# Patient Record
Sex: FEMALE | Race: WHITE | Employment: UNEMPLOYED | URBAN - METROPOLITAN AREA
[De-identification: names, ages, dates, MRNs, and addresses within clinical notes are randomized per-mention and may not be internally consistent; named-entity substitution may affect disease eponyms.]

---

## 2017-06-02 ENCOUNTER — HOSPITAL ENCOUNTER (OUTPATIENT)
Dept: MAMMOGRAPHY | Facility: HOSPITAL | Age: 52
Discharge: HOME | End: 2017-06-02
Attending: SURGERY
Payer: COMMERCIAL

## 2017-06-02 DIAGNOSIS — C50.919 BREAST CANCER (HCC): ICD-10-CM

## 2017-06-02 PROCEDURE — 77065 DX MAMMO INCL CAD UNI: CPT | Performed by: SURGERY

## 2017-06-02 PROCEDURE — 77063 BREAST TOMOSYNTHESIS BI: CPT | Performed by: SURGERY

## 2018-05-12 ENCOUNTER — HOSPITAL ENCOUNTER (OUTPATIENT)
Dept: MAMMOGRAPHY | Facility: HOSPITAL | Age: 53
Discharge: HOME | End: 2018-05-12
Attending: SURGERY
Payer: COMMERCIAL

## 2018-05-12 DIAGNOSIS — Z90.11 STATUS POST RIGHT MASTECTOMY: ICD-10-CM

## 2018-05-12 DIAGNOSIS — D05.10 DCIS (DUCTAL CARCINOMA IN SITU): ICD-10-CM

## 2018-05-12 PROCEDURE — 77067 SCR MAMMO BI INCL CAD: CPT | Performed by: SURGERY

## 2018-05-12 PROCEDURE — 77063 BREAST TOMOSYNTHESIS BI: CPT | Performed by: SURGERY

## 2018-08-07 ENCOUNTER — HOSPITAL ENCOUNTER (OUTPATIENT)
Dept: ULTRASOUND IMAGING | Facility: HOSPITAL | Age: 53
Discharge: HOME | End: 2018-08-07
Attending: SURGERY
Payer: COMMERCIAL

## 2018-08-07 ENCOUNTER — HOSPITAL ENCOUNTER (OUTPATIENT)
Dept: GENERAL RADIOLOGY | Facility: HOSPITAL | Age: 53
Discharge: HOME | End: 2018-08-07
Attending: SURGERY
Payer: COMMERCIAL

## 2018-08-07 DIAGNOSIS — M79.603 UPPER EXTREMITY PAIN: ICD-10-CM

## 2018-08-07 DIAGNOSIS — M79.603: ICD-10-CM

## 2018-08-07 PROCEDURE — 72070 X-RAY EXAM THORAC SPINE 2VWS: CPT | Performed by: SURGERY

## 2018-08-07 PROCEDURE — 93971 EXTREMITY STUDY: CPT | Performed by: SURGERY

## 2018-08-07 PROCEDURE — 72040 X-RAY EXAM NECK SPINE 2-3 VW: CPT | Performed by: SURGERY

## 2018-08-09 ENCOUNTER — HOSPITAL ENCOUNTER (OUTPATIENT)
Dept: ULTRASOUND IMAGING | Facility: HOSPITAL | Age: 53
Discharge: HOME | End: 2018-08-09
Attending: SURGERY
Payer: COMMERCIAL

## 2018-08-09 DIAGNOSIS — M79.603 UPPER EXTREMITY PAIN: ICD-10-CM

## 2018-08-09 PROCEDURE — 93931 UPPER EXTREMITY STUDY: CPT | Performed by: SURGERY

## 2020-04-27 ENCOUNTER — TELEPHONE (OUTPATIENT)
Dept: FAMILY MEDICINE CLINIC | Facility: CLINIC | Age: 55
End: 2020-04-27

## 2020-04-27 NOTE — TELEPHONE ENCOUNTER
Please schedule pt for appointment in office tomorrow morning. Pt will be given mask at front entrance.

## 2020-04-27 NOTE — TELEPHONE ENCOUNTER
Patient is calling in because she is a diabetic and her levels were supposed to be checked when she established care with Dr. Pierre Lund in March. Due to covid patient's appointment was canceled.  She has been unable to either be checked or go to the pharmacy to

## 2020-04-30 ENCOUNTER — OFFICE VISIT (OUTPATIENT)
Dept: FAMILY MEDICINE CLINIC | Facility: CLINIC | Age: 55
End: 2020-04-30
Payer: COMMERCIAL

## 2020-04-30 ENCOUNTER — LAB ENCOUNTER (OUTPATIENT)
Dept: LAB | Age: 55
End: 2020-04-30
Attending: FAMILY MEDICINE
Payer: COMMERCIAL

## 2020-04-30 VITALS
DIASTOLIC BLOOD PRESSURE: 75 MMHG | BODY MASS INDEX: 26.82 KG/M2 | HEIGHT: 65 IN | HEART RATE: 74 BPM | WEIGHT: 161 LBS | SYSTOLIC BLOOD PRESSURE: 125 MMHG

## 2020-04-30 DIAGNOSIS — E07.9 THYROID DISEASE: Primary | ICD-10-CM

## 2020-04-30 DIAGNOSIS — E11.9 TYPE 2 DIABETES MELLITUS WITHOUT COMPLICATION, WITHOUT LONG-TERM CURRENT USE OF INSULIN (HCC): ICD-10-CM

## 2020-04-30 DIAGNOSIS — I10 ESSENTIAL HYPERTENSION: ICD-10-CM

## 2020-04-30 DIAGNOSIS — Z85.3 HISTORY OF BREAST CANCER: ICD-10-CM

## 2020-04-30 DIAGNOSIS — E78.5 HYPERLIPIDEMIA, UNSPECIFIED HYPERLIPIDEMIA TYPE: ICD-10-CM

## 2020-04-30 PROCEDURE — 36415 COLL VENOUS BLD VENIPUNCTURE: CPT

## 2020-04-30 PROCEDURE — 82043 UR ALBUMIN QUANTITATIVE: CPT

## 2020-04-30 PROCEDURE — 85025 COMPLETE CBC W/AUTO DIFF WBC: CPT

## 2020-04-30 PROCEDURE — 80053 COMPREHEN METABOLIC PANEL: CPT

## 2020-04-30 PROCEDURE — 80061 LIPID PANEL: CPT

## 2020-04-30 PROCEDURE — 84443 ASSAY THYROID STIM HORMONE: CPT

## 2020-04-30 PROCEDURE — 83036 HEMOGLOBIN GLYCOSYLATED A1C: CPT

## 2020-04-30 PROCEDURE — 99203 OFFICE O/P NEW LOW 30 MIN: CPT | Performed by: FAMILY MEDICINE

## 2020-04-30 PROCEDURE — 82570 ASSAY OF URINE CREATININE: CPT

## 2020-04-30 RX ORDER — LISINOPRIL 40 MG/1
1 TABLET ORAL DAILY
COMMUNITY
Start: 2020-04-25 | End: 2020-05-02

## 2020-04-30 RX ORDER — ATORVASTATIN CALCIUM 20 MG/1
20 TABLET, FILM COATED ORAL DAILY
COMMUNITY
Start: 2019-11-05 | End: 2020-05-02

## 2020-04-30 RX ORDER — LEVOTHYROXINE SODIUM 0.05 MG/1
50 TABLET ORAL DAILY
COMMUNITY
Start: 2020-03-29 | End: 2020-05-02

## 2020-04-30 RX ORDER — GLIMEPIRIDE 2 MG/1
1 TABLET ORAL DAILY
COMMUNITY
Start: 2020-03-29 | End: 2020-05-02

## 2020-04-30 NOTE — PROGRESS NOTES
Marcia Guevara is a 49year old female. Patient presents with:  Hyperlipidemia  Hypertension  Diabetes    HPI:   New patient to me. Had breast Ca in 2002. Reports last mammogram last year at end of year.  Reports has been normal.   Reports diabetes is cont pedal pulses. Normal sensation. No ulcerations. ASSESSMENT AND PLAN:   1. Thyroid disease      2. Hyperlipidemia, unspecified hyperlipidemia type      3.  Type 2 diabetes mellitus without complication, without long-term current use of insulin ([facility] [address]  Isac

## 2020-05-02 NOTE — PROGRESS NOTES
Glucose is a bit elevated - I am increasing her glimepiride to twice a day. The other medications will stay the same. Continue to work on [employer] and exercise. Plan to recheck diabetes in 3 months.

## 2020-05-04 ENCOUNTER — TELEPHONE (OUTPATIENT)
Dept: FAMILY MEDICINE CLINIC | Facility: CLINIC | Age: 55
End: 2020-05-04

## 2020-05-04 NOTE — TELEPHONE ENCOUNTER
Pt call transferred to RN with [employer] , Angelina Marcelino agent# [id number] in place. Verified pt name and . Reviewed test results and recommendations as noted below. Pt agreed with plan of care and had no further questions at this time.       Notes re

## 2020-07-07 ENCOUNTER — TELEPHONE (OUTPATIENT)
Dept: FAMILY MEDICINE CLINIC | Facility: CLINIC | Age: 55
End: 2020-07-07

## 2020-07-07 DIAGNOSIS — E11.9 DIABETES MELLITUS WITH NO COMPLICATION (HCC): Primary | ICD-10-CM

## 2020-07-07 NOTE — TELEPHONE ENCOUNTER
Patient has a virtual diabetic follow up appointment scheduled 7/22 and is requesting orders be placed for any needed blood work. Patient is requesting a call once order are placed.

## 2020-07-07 NOTE — TELEPHONE ENCOUNTER
Dr. Mares General pt's last blood work was on 04/30/2020. Pt has appt on 7/22/2020. Per last notes to repeat Diabetes in 3 months. I ordered CMP did you want to add anything else. Please advise.

## 2020-07-15 ENCOUNTER — LAB ENCOUNTER (OUTPATIENT)
Dept: LAB | Age: 55
End: 2020-07-15
Attending: FAMILY MEDICINE
Payer: COMMERCIAL

## 2020-07-15 DIAGNOSIS — E11.9 TYPE 2 DIABETES MELLITUS WITHOUT COMPLICATION, WITHOUT LONG-TERM CURRENT USE OF INSULIN (HCC): ICD-10-CM

## 2020-07-15 DIAGNOSIS — E11.9 DIABETES MELLITUS WITH NO COMPLICATION (HCC): ICD-10-CM

## 2020-07-15 LAB
ALBUMIN SERPL-MCNC: 4.2 G/DL (ref 3.4–5)
ALBUMIN/GLOB SERPL: 1.1 {RATIO} (ref 1–2)
ALP LIVER SERPL-CCNC: 114 U/L (ref 41–108)
ALT SERPL-CCNC: 29 U/L (ref 13–56)
ANION GAP SERPL CALC-SCNC: 6 MMOL/L (ref 0–18)
AST SERPL-CCNC: 15 U/L (ref 15–37)
BILIRUB SERPL-MCNC: 0.6 MG/DL (ref 0.1–2)
BUN BLD-MCNC: 15 MG/DL (ref 7–18)
BUN/CREAT SERPL: 21.1 (ref 10–20)
CALCIUM BLD-MCNC: 9.6 MG/DL (ref 8.5–10.1)
CHLORIDE SERPL-SCNC: 103 MMOL/L (ref 98–112)
CO2 SERPL-SCNC: 28 MMOL/L (ref 21–32)
CREAT BLD-MCNC: 0.71 MG/DL (ref 0.55–1.02)
EST. AVERAGE GLUCOSE BLD GHB EST-MCNC: 148 MG/DL (ref 68–126)
GLOBULIN PLAS-MCNC: 3.8 G/DL (ref 2.8–4.4)
GLUCOSE BLD-MCNC: 103 MG/DL (ref 70–99)
HBA1C MFR BLD HPLC: 6.8 % (ref ?–5.7)
M PROTEIN MFR SERPL ELPH: 8 G/DL (ref 6.4–8.2)
OSMOLALITY SERPL CALC.SUM OF ELEC: 285 MOSM/KG (ref 275–295)
PATIENT FASTING Y/N/NP: YES
POTASSIUM SERPL-SCNC: 4.1 MMOL/L (ref 3.5–5.1)
SODIUM SERPL-SCNC: 137 MMOL/L (ref 136–145)

## 2020-07-15 PROCEDURE — 80053 COMPREHEN METABOLIC PANEL: CPT

## 2020-07-15 PROCEDURE — 36415 COLL VENOUS BLD VENIPUNCTURE: CPT

## 2020-07-15 PROCEDURE — 83036 HEMOGLOBIN GLYCOSYLATED A1C: CPT

## 2020-07-21 ENCOUNTER — VIRTUAL PHONE E/M (OUTPATIENT)
Dept: FAMILY MEDICINE CLINIC | Facility: CLINIC | Age: 55
End: 2020-07-21
Payer: COMMERCIAL

## 2020-07-21 DIAGNOSIS — E11.9 DIABETES MELLITUS WITH NO COMPLICATION (HCC): Primary | ICD-10-CM

## 2020-07-21 DIAGNOSIS — E07.9 THYROID DISEASE: ICD-10-CM

## 2020-07-21 DIAGNOSIS — Z12.31 VISIT FOR SCREENING MAMMOGRAM: ICD-10-CM

## 2020-07-21 PROCEDURE — 99441 PHONE E/M BY PHYS 5-10 MIN: CPT | Performed by: FAMILY MEDICINE

## 2020-07-21 RX ORDER — GLIMEPIRIDE 1 MG/1
1 TABLET ORAL 2 TIMES DAILY
Qty: 180 TABLET | Refills: 2 | Status: SHIPPED | OUTPATIENT
Start: 2020-07-21 | End: 2021-02-04

## 2020-07-21 NOTE — PROGRESS NOTES
Virtual Telephone Check-In    Mendez Sheppard verbally consents to a Virtual/Telephone Check-In visit on 07/21/20. Patient has been referred to the [facility] website at [email or web address] to review the yearly Consent to Treat document.     Patient underst denies cough  CARDIOVASCULAR: denies chest pain  GI: denies abdominal pain and denies heartburn  NEURO: denies headaches  Musculoskeletal: no joint pain, back pain    EXAM:   There were no vitals taken for this visit.   GENERAL: speaking in complete sentenc

## 2020-07-22 ENCOUNTER — TELEPHONE (OUTPATIENT)
Dept: FAMILY MEDICINE CLINIC | Facility: CLINIC | Age: 55
End: 2020-07-22

## 2020-07-22 DIAGNOSIS — Z12.31 BREAST CANCER SCREENING BY MAMMOGRAM: Primary | ICD-10-CM

## 2020-07-22 NOTE — TELEPHONE ENCOUNTER
Patient states she has an order for mammogram, she forgot to tell Dr that she only has one breast. Requesting for order to be changed to only left breast. She has appointment on 7/24 and does not want to run the risk of checking both breast. Requesting vivian

## 2020-07-24 ENCOUNTER — HOSPITAL ENCOUNTER (OUTPATIENT)
Dept: MAMMOGRAPHY | Age: 55
Discharge: HOME | End: 2020-07-24
Attending: FAMILY MEDICINE
Payer: COMMERCIAL

## 2020-07-24 DIAGNOSIS — Z12.31 BREAST CANCER SCREENING BY MAMMOGRAM: ICD-10-CM

## 2020-07-24 DIAGNOSIS — Z12.31 VISIT FOR SCREENING MAMMOGRAM: ICD-10-CM

## 2020-07-24 PROCEDURE — 77067 SCR MAMMO BI INCL CAD: CPT | Performed by: FAMILY MEDICINE

## 2020-07-24 PROCEDURE — 77063 BREAST TOMOSYNTHESIS BI: CPT | Performed by: FAMILY MEDICINE

## 2020-07-30 ENCOUNTER — TELEPHONE (OUTPATIENT)
Dept: FAMILY MEDICINE CLINIC | Facility: CLINIC | Age: 55
End: 2020-07-30

## 2020-07-30 NOTE — TELEPHONE ENCOUNTER
Pt called for mammogram results and informed of results in Stat[employer] language along with Dr Huey Richard recommendations. Pt verbalized understanding and agreed with MD plan.       Marisa Hernandez MD  7/29/2020  3:08 PM      Normal mammogram. Plan for repeat in 1 y

## 2020-10-20 ENCOUNTER — OFFICE VISIT (OUTPATIENT)
Dept: FAMILY MEDICINE CLINIC | Facility: CLINIC | Age: 55
End: 2020-10-20
Payer: COMMERCIAL

## 2020-10-20 ENCOUNTER — LAB ENCOUNTER (OUTPATIENT)
Dept: LAB | Age: 55
End: 2020-10-20
Attending: FAMILY MEDICINE
Payer: COMMERCIAL

## 2020-10-20 VITALS
WEIGHT: 164 LBS | TEMPERATURE: 98 F | BODY MASS INDEX: 27 KG/M2 | SYSTOLIC BLOOD PRESSURE: 111 MMHG | DIASTOLIC BLOOD PRESSURE: 64 MMHG | HEART RATE: 68 BPM

## 2020-10-20 DIAGNOSIS — E78.5 HYPERLIPIDEMIA, UNSPECIFIED HYPERLIPIDEMIA TYPE: ICD-10-CM

## 2020-10-20 DIAGNOSIS — G89.29 CHRONIC HEEL PAIN, RIGHT: ICD-10-CM

## 2020-10-20 DIAGNOSIS — E11.9 DIABETES MELLITUS WITH NO COMPLICATION (HCC): ICD-10-CM

## 2020-10-20 DIAGNOSIS — I10 ESSENTIAL HYPERTENSION: Primary | ICD-10-CM

## 2020-10-20 DIAGNOSIS — E07.9 THYROID DISEASE: ICD-10-CM

## 2020-10-20 DIAGNOSIS — M79.671 CHRONIC HEEL PAIN, RIGHT: ICD-10-CM

## 2020-10-20 DIAGNOSIS — E11.9 TYPE 2 DIABETES MELLITUS WITHOUT COMPLICATION, WITHOUT LONG-TERM CURRENT USE OF INSULIN (HCC): ICD-10-CM

## 2020-10-20 PROCEDURE — 84439 ASSAY OF FREE THYROXINE: CPT

## 2020-10-20 PROCEDURE — 3078F DIAST BP <80 MM HG: CPT | Performed by: FAMILY MEDICINE

## 2020-10-20 PROCEDURE — 83036 HEMOGLOBIN GLYCOSYLATED A1C: CPT

## 2020-10-20 PROCEDURE — 84443 ASSAY THYROID STIM HORMONE: CPT

## 2020-10-20 PROCEDURE — 99214 OFFICE O/P EST MOD 30 MIN: CPT | Performed by: FAMILY MEDICINE

## 2020-10-20 PROCEDURE — 36415 COLL VENOUS BLD VENIPUNCTURE: CPT

## 2020-10-20 PROCEDURE — 3074F SYST BP LT 130 MM HG: CPT | Performed by: FAMILY MEDICINE

## 2020-10-20 NOTE — PROGRESS NOTES
Merlin Pilar is a 48year old female. Patient presents with:  Diabetes: medication follow up    HPI:   Reports glucose 110-130 range - very rarely down to 60s. - only when very active. Has been walking. Trying to eat healthy.    Reports 1 year with pain i to auscultation  CARDIO: RRR without murmur  EXTREMITIES: no cyanosis, clubbing or edema  FEET: normal pedal pulses. Normal sensation. No ulceration. Right posterior heel tenderness. ASSESSMENT AND PLAN:   1. Essential hypertension  BP stable    2.  Hyp

## 2020-10-23 ENCOUNTER — TELEPHONE (OUTPATIENT)
Dept: FAMILY MEDICINE CLINIC | Facility: CLINIC | Age: 55
End: 2020-10-23

## 2020-10-24 NOTE — PROGRESS NOTES
Thyroid levels are stable - continue current dose. Diabetes is a bit worse - be better with the diet. Will not change medications for now.

## 2021-01-05 ENCOUNTER — TELEPHONE (OUTPATIENT)
Dept: FAMILY MEDICINE CLINIC | Facility: CLINIC | Age: 56
End: 2021-01-05

## 2021-01-05 DIAGNOSIS — E11.9 TYPE 2 DIABETES MELLITUS WITHOUT COMPLICATION, WITHOUT LONG-TERM CURRENT USE OF INSULIN (HCC): Primary | ICD-10-CM

## 2021-01-05 NOTE — TELEPHONE ENCOUNTER
Left voicemail for patient informing her that order was placed and tests should be done fasting. If she had any questions or concerns to call back.

## 2021-01-14 ENCOUNTER — LAB ENCOUNTER (OUTPATIENT)
Dept: LAB | Age: 56
End: 2021-01-14
Attending: FAMILY MEDICINE
Payer: COMMERCIAL

## 2021-01-14 DIAGNOSIS — E11.9 TYPE 2 DIABETES MELLITUS WITHOUT COMPLICATION, WITHOUT LONG-TERM CURRENT USE OF INSULIN (HCC): ICD-10-CM

## 2021-01-14 LAB
ALBUMIN SERPL-MCNC: 4 G/DL (ref 3.4–5)
ALBUMIN/GLOB SERPL: 1 {RATIO} (ref 1–2)
ALP LIVER SERPL-CCNC: 116 U/L
ALT SERPL-CCNC: 28 U/L
ANION GAP SERPL CALC-SCNC: 5 MMOL/L (ref 0–18)
AST SERPL-CCNC: 16 U/L (ref 15–37)
BASOPHILS # BLD AUTO: 0.02 X10(3) UL (ref 0–0.2)
BASOPHILS NFR BLD AUTO: 0.3 %
BILIRUB SERPL-MCNC: 0.5 MG/DL (ref 0.1–2)
BUN BLD-MCNC: 12 MG/DL (ref 7–18)
BUN/CREAT SERPL: 17.1 (ref 10–20)
CALCIUM BLD-MCNC: 9.6 MG/DL (ref 8.5–10.1)
CHLORIDE SERPL-SCNC: 108 MMOL/L (ref 98–112)
CHOLEST SMN-MCNC: 126 MG/DL (ref ?–200)
CO2 SERPL-SCNC: 28 MMOL/L (ref 21–32)
CREAT BLD-MCNC: 0.7 MG/DL
DEPRECATED RDW RBC AUTO: 40.1 FL (ref 35.1–46.3)
EOSINOPHIL # BLD AUTO: 0.11 X10(3) UL (ref 0–0.7)
EOSINOPHIL NFR BLD AUTO: 1.5 %
ERYTHROCYTE [DISTWIDTH] IN BLOOD BY AUTOMATED COUNT: 12.2 % (ref 11–15)
EST. AVERAGE GLUCOSE BLD GHB EST-MCNC: 163 MG/DL (ref 68–126)
GLOBULIN PLAS-MCNC: 4 G/DL (ref 2.8–4.4)
GLUCOSE BLD-MCNC: 123 MG/DL (ref 70–99)
HBA1C MFR BLD HPLC: 7.3 % (ref ?–5.7)
HCT VFR BLD AUTO: 41.4 %
HDLC SERPL-MCNC: 42 MG/DL (ref 40–59)
HGB BLD-MCNC: 13.5 G/DL
IMM GRANULOCYTES # BLD AUTO: 0.01 X10(3) UL (ref 0–1)
IMM GRANULOCYTES NFR BLD: 0.1 %
LDLC SERPL CALC-MCNC: 61 MG/DL (ref ?–100)
LYMPHOCYTES # BLD AUTO: 3.16 X10(3) UL (ref 1–4)
LYMPHOCYTES NFR BLD AUTO: 43.3 %
M PROTEIN MFR SERPL ELPH: 8 G/DL (ref 6.4–8.2)
MCH RBC QN AUTO: 29.5 PG (ref 26–34)
MCHC RBC AUTO-ENTMCNC: 32.6 G/DL (ref 31–37)
MCV RBC AUTO: 90.6 FL
MONOCYTES # BLD AUTO: 0.29 X10(3) UL (ref 0.1–1)
MONOCYTES NFR BLD AUTO: 4 %
NEUTROPHILS # BLD AUTO: 3.71 X10 (3) UL (ref 1.5–7.7)
NEUTROPHILS # BLD AUTO: 3.71 X10(3) UL (ref 1.5–7.7)
NEUTROPHILS NFR BLD AUTO: 50.8 %
NONHDLC SERPL-MCNC: 84 MG/DL (ref ?–130)
OSMOLALITY SERPL CALC.SUM OF ELEC: 293 MOSM/KG (ref 275–295)
PATIENT FASTING Y/N/NP: YES
PATIENT FASTING Y/N/NP: YES
PLATELET # BLD AUTO: 222 10(3)UL (ref 150–450)
POTASSIUM SERPL-SCNC: 4.2 MMOL/L (ref 3.5–5.1)
RBC # BLD AUTO: 4.57 X10(6)UL
SODIUM SERPL-SCNC: 141 MMOL/L (ref 136–145)
TRIGL SERPL-MCNC: 116 MG/DL (ref 30–149)
TSI SER-ACNC: 1.24 MIU/ML (ref 0.36–3.74)
VLDLC SERPL CALC-MCNC: 23 MG/DL (ref 0–30)
WBC # BLD AUTO: 7.3 X10(3) UL (ref 4–11)

## 2021-01-14 PROCEDURE — 80053 COMPREHEN METABOLIC PANEL: CPT

## 2021-01-14 PROCEDURE — 80061 LIPID PANEL: CPT

## 2021-01-14 PROCEDURE — 83036 HEMOGLOBIN GLYCOSYLATED A1C: CPT

## 2021-01-14 PROCEDURE — 36415 COLL VENOUS BLD VENIPUNCTURE: CPT

## 2021-01-14 PROCEDURE — 84443 ASSAY THYROID STIM HORMONE: CPT

## 2021-01-14 PROCEDURE — 85025 COMPLETE CBC W/AUTO DIFF WBC: CPT

## 2021-01-15 ENCOUNTER — TELEPHONE (OUTPATIENT)
Dept: FAMILY MEDICINE CLINIC | Facility: CLINIC | Age: 56
End: 2021-01-15

## 2021-01-15 RX ORDER — EMPAGLIFLOZIN 10 MG/1
10 TABLET, FILM COATED ORAL DAILY
Qty: 90 TABLET | Refills: 1 | Status: SHIPPED | OUTPATIENT
Start: 2021-01-15 | End: 2021-02-14

## 2021-01-15 RX ORDER — CANAGLIFLOZIN 100 MG/1
100 TABLET, FILM COATED ORAL
Qty: 90 TABLET | Refills: 0 | Status: SHIPPED | OUTPATIENT
Start: 2021-01-15 | End: 2021-01-15

## 2021-01-15 NOTE — TELEPHONE ENCOUNTER
Per pharmacy, PA needed for the following medication:    •  Canagliflozin (INVOKANA) 100 MG Oral Tab, Take 100 mg by mouth every morning before breakfast., Disp: 90 tablet, Rfl: 0    Pharmacy comments:  Plan does not cover medication.  Please call plan at 8

## 2021-01-15 NOTE — PROGRESS NOTES
Cholesterol is stable. Diabetes has not improved. I want her better controlled. I will add another medication called invokana 100 mg in the mornings. It can help with weight loss also. See me in March in the office. Will check the a1c in the office.

## 2021-02-04 ENCOUNTER — TELEPHONE (OUTPATIENT)
Dept: FAMILY MEDICINE CLINIC | Facility: CLINIC | Age: 56
End: 2021-02-04

## 2021-02-04 NOTE — TELEPHONE ENCOUNTER
Pt asking if she should continue taking Jardiance. States her blood sugars have been low since starting the Jardiance about two weeks ago. Also medication makes her feel \"bad\", she gets dizzy.  Yesterday, 2/3/21 blood sugar was 56 at 1 PM. Pt takes [place]

## 2021-02-04 NOTE — TELEPHONE ENCOUNTER
I want her to try holding the glimepiride and only take jardiance and metformin. The reason is Tressia Colder has been shown to decrease heart disease also so it is a good medication for diabetes.  If she is still dizzy, then will go back to original medications

## 2021-02-04 NOTE — TELEPHONE ENCOUNTER
Advised patient on Dr. Javier Peer information and recommendations. Reviewed with patient. Advised to call back as needed, but to call back if worse. Patient verbalized understanding. Medication list updated.      Vandana Whitehead [id number]

## 2021-02-04 NOTE — TELEPHONE ENCOUNTER
Empagliflozin (JARDIANCE) 10 MG Oral Tab      Patient states with  Henri Prado [id number] that the above medication is making her sugar low and feeling unwell should she continue take it or take half of pill

## 2021-03-12 DIAGNOSIS — Z23 NEED FOR VACCINATION: ICD-10-CM

## 2021-03-15 ENCOUNTER — IMMUNIZATION (OUTPATIENT)
Dept: LAB | Facility: HOSPITAL | Age: 56
End: 2021-03-15
Attending: HOSPITALIST
Payer: COMMERCIAL

## 2021-03-15 DIAGNOSIS — Z23 NEED FOR VACCINATION: Primary | ICD-10-CM

## 2021-03-15 PROCEDURE — 0011A SARSCOV2 VAC 100MCG/0.5ML IM: CPT

## 2021-03-23 ENCOUNTER — OFFICE VISIT (OUTPATIENT)
Dept: FAMILY MEDICINE CLINIC | Facility: CLINIC | Age: 56
End: 2021-03-23
Payer: COMMERCIAL

## 2021-03-23 VITALS
DIASTOLIC BLOOD PRESSURE: 74 MMHG | TEMPERATURE: 97 F | SYSTOLIC BLOOD PRESSURE: 122 MMHG | BODY MASS INDEX: 26.29 KG/M2 | HEIGHT: 65 IN | WEIGHT: 157.81 LBS | HEART RATE: 64 BPM

## 2021-03-23 DIAGNOSIS — E11.9 TYPE 2 DIABETES MELLITUS WITHOUT COMPLICATION, WITHOUT LONG-TERM CURRENT USE OF INSULIN (HCC): Primary | ICD-10-CM

## 2021-03-23 DIAGNOSIS — I10 ESSENTIAL HYPERTENSION: ICD-10-CM

## 2021-03-23 DIAGNOSIS — E07.9 THYROID DISEASE: ICD-10-CM

## 2021-03-23 DIAGNOSIS — E78.5 HYPERLIPIDEMIA, UNSPECIFIED HYPERLIPIDEMIA TYPE: ICD-10-CM

## 2021-03-23 LAB
CARTRIDGE LOT#: ABNORMAL NUMERIC
HEMOGLOBIN A1C: 6.5 % (ref 4.3–5.6)

## 2021-03-23 PROCEDURE — 3078F DIAST BP <80 MM HG: CPT | Performed by: FAMILY MEDICINE

## 2021-03-23 PROCEDURE — 3044F HG A1C LEVEL LT 7.0%: CPT | Performed by: FAMILY MEDICINE

## 2021-03-23 PROCEDURE — 99214 OFFICE O/P EST MOD 30 MIN: CPT | Performed by: FAMILY MEDICINE

## 2021-03-23 PROCEDURE — 3074F SYST BP LT 130 MM HG: CPT | Performed by: FAMILY MEDICINE

## 2021-03-23 PROCEDURE — 36416 COLLJ CAPILLARY BLOOD SPEC: CPT | Performed by: FAMILY MEDICINE

## 2021-03-23 PROCEDURE — 83036 HEMOGLOBIN GLYCOSYLATED A1C: CPT | Performed by: FAMILY MEDICINE

## 2021-03-23 PROCEDURE — 3008F BODY MASS INDEX DOCD: CPT | Performed by: FAMILY MEDICINE

## 2021-03-23 RX ORDER — GLIMEPIRIDE 1 MG/1
TABLET ORAL
COMMUNITY
Start: 2021-02-23 | End: 2021-03-23

## 2021-03-23 RX ORDER — EMPAGLIFLOZIN 10 MG/1
1 TABLET, FILM COATED ORAL DAILY
COMMUNITY
Start: 2021-03-11 | End: 2021-03-23

## 2021-03-23 RX ORDER — GLIMEPIRIDE 1 MG/1
1 TABLET ORAL
Qty: 90 TABLET | Refills: 1 | Status: SHIPPED | OUTPATIENT
Start: 2021-03-23 | End: 2021-06-26

## 2021-03-23 RX ORDER — EMPAGLIFLOZIN 10 MG/1
1 TABLET, FILM COATED ORAL DAILY
Qty: 90 TABLET | Refills: 1 | Status: SHIPPED | OUTPATIENT
Start: 2021-03-23 | End: 2021-12-24

## 2021-03-23 NOTE — PROGRESS NOTES
Kaylyn King is a 48year old female. Patient presents with:  Medication Follow-Up: Jardiance    HPI:   Pt reports improving glucose and feeling better overall. Reports improved tingling in her feet.     Received 1st covid vaccine Moderna - felt fine oth 157 lb 12.8 oz (71.6 kg)   BMI 26.26 kg/m²   GENERAL: well developed, well nourished,in no apparent distress  SKIN: no rashes,no suspicious lesions  HEENT: atraumatic, normocephalic,ears and throat are clear  NECK: supple,no adenopathy,no bruits  LUNGS: cl

## 2021-04-12 ENCOUNTER — IMMUNIZATION (OUTPATIENT)
Dept: LAB | Facility: HOSPITAL | Age: 56
End: 2021-04-12
Attending: EMERGENCY MEDICINE
Payer: COMMERCIAL

## 2021-04-12 DIAGNOSIS — Z23 NEED FOR VACCINATION: Primary | ICD-10-CM

## 2021-04-12 PROCEDURE — 0012A SARSCOV2 VAC 100MCG/0.5ML IM: CPT

## 2021-05-05 ENCOUNTER — TELEPHONE (OUTPATIENT)
Dept: FAMILY MEDICINE CLINIC | Facility: CLINIC | Age: 56
End: 2021-05-05

## 2021-05-05 NOTE — TELEPHONE ENCOUNTER
Left message for patient, please inform of lab orders already in the system. Generated during office visit 03/23/2021.

## 2021-06-18 ENCOUNTER — LAB ENCOUNTER (OUTPATIENT)
Dept: LAB | Age: 56
End: 2021-06-18
Attending: FAMILY MEDICINE
Payer: COMMERCIAL

## 2021-06-18 DIAGNOSIS — E11.9 TYPE 2 DIABETES MELLITUS WITHOUT COMPLICATION, WITHOUT LONG-TERM CURRENT USE OF INSULIN (HCC): ICD-10-CM

## 2021-06-18 PROCEDURE — 84443 ASSAY THYROID STIM HORMONE: CPT

## 2021-06-18 PROCEDURE — 82570 ASSAY OF URINE CREATININE: CPT

## 2021-06-18 PROCEDURE — 80053 COMPREHEN METABOLIC PANEL: CPT

## 2021-06-18 PROCEDURE — 80061 LIPID PANEL: CPT

## 2021-06-18 PROCEDURE — 3061F NEG MICROALBUMINURIA REV: CPT | Performed by: FAMILY MEDICINE

## 2021-06-18 PROCEDURE — 83036 HEMOGLOBIN GLYCOSYLATED A1C: CPT

## 2021-06-18 PROCEDURE — 82043 UR ALBUMIN QUANTITATIVE: CPT

## 2021-06-18 PROCEDURE — 85025 COMPLETE CBC W/AUTO DIFF WBC: CPT

## 2021-06-18 PROCEDURE — 36415 COLL VENOUS BLD VENIPUNCTURE: CPT

## 2021-06-18 PROCEDURE — 3051F HG A1C>EQUAL 7.0%<8.0%: CPT | Performed by: FAMILY MEDICINE

## 2021-06-22 NOTE — PROGRESS NOTES
Brandon Carvajal - There is slight worsening of your diabetes. Please be better with your diet and increase exercise. Will discuss at your upcoming appointment.  Rest of the labs were stable. - Dr. Mcgee Erm

## 2021-06-26 RX ORDER — GLIMEPIRIDE 1 MG/1
1 TABLET ORAL 2 TIMES DAILY
Qty: 180 TABLET | Refills: 1 | Status: SHIPPED | OUTPATIENT
Start: 2021-06-26 | End: 2021-12-16

## 2021-06-28 ENCOUNTER — OFFICE VISIT (OUTPATIENT)
Dept: FAMILY MEDICINE CLINIC | Facility: CLINIC | Age: 56
End: 2021-06-28
Payer: COMMERCIAL

## 2021-06-28 VITALS
SYSTOLIC BLOOD PRESSURE: 100 MMHG | DIASTOLIC BLOOD PRESSURE: 57 MMHG | HEART RATE: 62 BPM | TEMPERATURE: 97 F | WEIGHT: 159 LBS | BODY MASS INDEX: 26.49 KG/M2 | HEIGHT: 65 IN

## 2021-06-28 DIAGNOSIS — I10 ESSENTIAL HYPERTENSION: ICD-10-CM

## 2021-06-28 DIAGNOSIS — E78.5 HYPERLIPIDEMIA, UNSPECIFIED HYPERLIPIDEMIA TYPE: ICD-10-CM

## 2021-06-28 DIAGNOSIS — E07.9 THYROID DISEASE: ICD-10-CM

## 2021-06-28 DIAGNOSIS — E11.9 TYPE 2 DIABETES MELLITUS WITHOUT COMPLICATION, WITHOUT LONG-TERM CURRENT USE OF INSULIN (HCC): Primary | ICD-10-CM

## 2021-06-28 PROCEDURE — 99214 OFFICE O/P EST MOD 30 MIN: CPT | Performed by: FAMILY MEDICINE

## 2021-06-28 PROCEDURE — 90750 HZV VACC RECOMBINANT IM: CPT | Performed by: FAMILY MEDICINE

## 2021-06-28 PROCEDURE — 3008F BODY MASS INDEX DOCD: CPT | Performed by: FAMILY MEDICINE

## 2021-06-28 PROCEDURE — 3074F SYST BP LT 130 MM HG: CPT | Performed by: FAMILY MEDICINE

## 2021-06-28 PROCEDURE — 3078F DIAST BP <80 MM HG: CPT | Performed by: FAMILY MEDICINE

## 2021-06-28 PROCEDURE — 90471 IMMUNIZATION ADMIN: CPT | Performed by: FAMILY MEDICINE

## 2021-06-28 RX ORDER — LISINOPRIL 40 MG/1
40 TABLET ORAL DAILY
Qty: 90 TABLET | Refills: 4 | Status: SHIPPED | OUTPATIENT
Start: 2021-06-28

## 2021-06-28 RX ORDER — LEVOTHYROXINE SODIUM 0.05 MG/1
50 TABLET ORAL DAILY
Qty: 90 TABLET | Refills: 4 | Status: SHIPPED | OUTPATIENT
Start: 2021-06-28

## 2021-06-28 RX ORDER — ATORVASTATIN CALCIUM 20 MG/1
20 TABLET, FILM COATED ORAL DAILY
Qty: 90 TABLET | Refills: 4 | Status: SHIPPED | OUTPATIENT
Start: 2021-06-28

## 2021-06-28 NOTE — PROGRESS NOTES
Jermaine Perez is a 48year old female. Patient presents with:  Diabetes: f/u    HPI:   Pt reports was in [facility] for 2 weeks on vacation so eating more and not well during that time.  Reports AM glucose 115, 118 but while in [place] was a bit higher 130s at headaches  Musculoskeletal: no joint pain, back pain    EXAM:   /57   Pulse 62   Temp 96.6 °F (35.9 °C) (Temporal)   Ht 5' 5\" (1.651 m)   Wt 159 lb (72.1 kg)   BMI 26.46 kg/m²   GENERAL: well developed, well nourished,in no apparent distress  NECK:

## 2021-09-17 ENCOUNTER — TELEPHONE (OUTPATIENT)
Dept: FAMILY MEDICINE CLINIC | Facility: CLINIC | Age: 56
End: 2021-09-17

## 2021-09-17 NOTE — TELEPHONE ENCOUNTER
Norwegian speaking - pt has appt on 10/2 and is requesting labs so she can do them beforehand. Please call when ready.

## 2021-09-20 NOTE — TELEPHONE ENCOUNTER
Called patient, no answer, left voicemail to call the office back.     She only needs her A1C and order already there. If she prefers, can just do fingerstick in office that day also instead of blood draw.

## 2021-10-02 ENCOUNTER — TELEPHONE (OUTPATIENT)
Dept: FAMILY MEDICINE CLINIC | Facility: CLINIC | Age: 56
End: 2021-10-02

## 2021-10-02 ENCOUNTER — OFFICE VISIT (OUTPATIENT)
Dept: FAMILY MEDICINE CLINIC | Facility: CLINIC | Age: 56
End: 2021-10-02
Payer: COMMERCIAL

## 2021-10-02 VITALS
HEART RATE: 61 BPM | TEMPERATURE: 98 F | DIASTOLIC BLOOD PRESSURE: 65 MMHG | WEIGHT: 152 LBS | HEIGHT: 65 IN | SYSTOLIC BLOOD PRESSURE: 109 MMHG | BODY MASS INDEX: 25.33 KG/M2

## 2021-10-02 DIAGNOSIS — Z12.31 VISIT FOR SCREENING MAMMOGRAM: Primary | ICD-10-CM

## 2021-10-02 DIAGNOSIS — E11.9 TYPE 2 DIABETES MELLITUS WITHOUT COMPLICATION, WITHOUT LONG-TERM CURRENT USE OF INSULIN (HCC): Primary | ICD-10-CM

## 2021-10-02 DIAGNOSIS — Z12.31 VISIT FOR SCREENING MAMMOGRAM: ICD-10-CM

## 2021-10-02 PROCEDURE — 3008F BODY MASS INDEX DOCD: CPT | Performed by: FAMILY MEDICINE

## 2021-10-02 PROCEDURE — 90471 IMMUNIZATION ADMIN: CPT | Performed by: FAMILY MEDICINE

## 2021-10-02 PROCEDURE — 3078F DIAST BP <80 MM HG: CPT | Performed by: FAMILY MEDICINE

## 2021-10-02 PROCEDURE — 36415 COLL VENOUS BLD VENIPUNCTURE: CPT | Performed by: FAMILY MEDICINE

## 2021-10-02 PROCEDURE — 90686 IIV4 VACC NO PRSV 0.5 ML IM: CPT | Performed by: FAMILY MEDICINE

## 2021-10-02 PROCEDURE — 99214 OFFICE O/P EST MOD 30 MIN: CPT | Performed by: FAMILY MEDICINE

## 2021-10-02 PROCEDURE — 3074F SYST BP LT 130 MM HG: CPT | Performed by: FAMILY MEDICINE

## 2021-10-02 PROCEDURE — 83036 HEMOGLOBIN GLYCOSYLATED A1C: CPT | Performed by: FAMILY MEDICINE

## 2021-10-02 NOTE — TELEPHONE ENCOUNTER
Patient is calling regarding order for mammogram. Patient states order needs to be updated to mammogram of left breast only.

## 2021-10-02 NOTE — PROGRESS NOTES
Andrew Howell is a 55year old female. Patient presents with:  Diabetes: F/U    HPI:   Reports her glucose can go up to 140-150s and lowest 100s. Reports stress at home. Working on her feet and right foot big toe numbness. - no pain.    metFORMIN HCl 100 °C) (Tympanic)   Ht 5' 5\" (1.651 m)   Wt 152 lb (68.9 kg)   BMI 25.29 kg/m²   GENERAL: well developed, well nourished,in no apparent distress  SKIN: no rashes,no suspicious lesions  LUNGS: clear to auscultation  CARDIO: RRR without murmur  EXTREMITIES: no

## 2021-10-03 NOTE — TELEPHONE ENCOUNTER
Dr. Mares General:   Please see patient e-mail and advise   Mammogram done on 7/24/20 was left breast only   Bilateral mammogram recommended in report.

## 2021-11-06 ENCOUNTER — HOSPITAL ENCOUNTER (OUTPATIENT)
Dept: MAMMOGRAPHY | Age: 56
Discharge: HOME | End: 2021-11-06
Attending: FAMILY MEDICINE
Payer: COMMERCIAL

## 2021-11-06 DIAGNOSIS — Z12.31 VISIT FOR SCREENING MAMMOGRAM: ICD-10-CM

## 2021-11-06 PROCEDURE — 77067 SCR MAMMO BI INCL CAD: CPT | Performed by: FAMILY MEDICINE

## 2021-11-06 PROCEDURE — 77063 BREAST TOMOSYNTHESIS BI: CPT | Performed by: FAMILY MEDICINE

## 2021-11-07 NOTE — PROGRESS NOTES
H&P  DELIVERY:              Pick one:          _X              _  Forceps        _  Vacuum        _  C/S: Reason _              Pick one:          _X  Cephalic        _  Breech  PROM: _ Yes   X No        Antibiotics: _  MOTHER:    G  2_     P  _1   Mother’s Blood Type:  _A+   GBS:  _  Pos X_  Neg    _  Unk Antibiotics:  _ doses   Hepatitis:  _N Rubella:  I_ RPR:  N  HIV: N_   Maternal medications:  _  BABY:     EGA:  39.6_ Weeks Baby’s Blood Type:  _  Birth Date Birth Time Weight Length Chest circ. Head circ.   _ _ 7_  lbs   _12  oz _  inches _ _     APGAR 1min APGAR 5min APGAR 10min   _9 _9 _9                 COMPLICATIONS:  _  PHYSICIAL EXAMINATION:     As Defined Comments /Deviations from Defined   General Appearance AGA, appropriate tone, lusty cry X _   Skin Warm, dry, intact, smooth, soft, good turgor, matt. Color normal X _   Head/Neck Symmetrical, round, fontanels soft, flat, sutures palpable slightly opened, symmetrical face X _   Eyes Cornea and lens clear and intact, symmetrical position, KALIE, red reflex + X _   Ears/Nose/Throat Tympanic membrane intact, pinna curved, firm with matt placement, nares patent, throat clear & pink, tongue midline, palate intact X _   Thorax Symmetrical, rounded, palpable breast tissue, normal nipple placement, clavicals intact, full ROM of shoulders X _   Lungs Lungs clear, bilateral breath sounds: equal, unlabored respiration, rate matt for age X _   Heart  Regular rate and rhythm, no abnormal sounds X _   Abdomen Soft, symmetrical, rounded, cord clear and drying, bowel sounds + X _   Genitalia Matt for given sex X _   Anus Present, patent with normal wink reflex X _   Trunk/Spine Straight, no specific dimple or sinus X _   Extremities 10 fingers, 10 toes, symmetrical appearance, full ROM, Gates and Plantar creases present, Rock, Otolani sign, no signs of hip dysplasia X _   Reflexes Gag +, rooting +, Palmar & Plantar grasp +, sucking, ángel +, Babinski +,  Normal mammogram. Plan for repeat in 1 year. stepping/walking +, traction response + X _     ASSESSMENT / PLAN:  _  _  XHealthy term       X_  Routine nursery care

## 2021-12-01 ENCOUNTER — NURSE TRIAGE (OUTPATIENT)
Dept: FAMILY MEDICINE CLINIC | Facility: CLINIC | Age: 56
End: 2021-12-01

## 2021-12-01 NOTE — TELEPHONE ENCOUNTER
Action Requested: Summary for Provider     []  Critical Lab, Recommendations Needed  [] Need Additional Advice  []   FYI    []   Need Orders  [] Need Medications Sent to Pharmacy  []  Other     SUMMARY: Per protocol advised : Office visit   Future Appointm

## 2021-12-16 RX ORDER — GLIMEPIRIDE 1 MG/1
1 TABLET ORAL 2 TIMES DAILY
Qty: 180 TABLET | Refills: 1 | Status: SHIPPED | OUTPATIENT
Start: 2021-12-16

## 2021-12-16 NOTE — TELEPHONE ENCOUNTER
Refill passed per [facility], [facility] protocol.    Requested Prescriptions   Pending Prescriptions Disp Refills    GLIMEPIRIDE 1 MG Oral Tab [Pharmacy Med Name: GLIMEPIRIDE 1MG TABLETS] 180 tablet 1     Sig: TAKE 1 TABLET(1 MG) BY MOUTH TWICE DAILY        Hoang

## 2021-12-24 RX ORDER — EMPAGLIFLOZIN 10 MG/1
10 TABLET, FILM COATED ORAL DAILY
Qty: 90 TABLET | Refills: 1 | Status: SHIPPED | OUTPATIENT
Start: 2021-12-24 | End: 2022-01-15

## 2021-12-24 NOTE — TELEPHONE ENCOUNTER
Refill passed per [address] [place] protocol. Requested Prescriptions   Pending Prescriptions Disp Refills    JARDIANCE 10 MG Oral Tab [Pharmacy Med Name: JARDIANCE 10MG TABLETS] 90 tablet 1     Sig: Take 1 tablet by mouth daily.         Diabetes Medication Protocol Passed - 12/24/2021  9:28 AM        Passed - Last A1C < 7.5 and within past 6 months     Lab Results   Component Value Date    A1C 6.5 (A) 10/02/2021               Passed - Appointment in past 6 or next 3 months        Passed - GFR Non- > 50     Lab Results   Component Value Date    GFRNAA 99 06/18/2021                 Passed - GFR in the past 12 months              Recent Outpatient Visits              2 months ago Type 2 diabetes mellitus without complication, without long-term current use of insulin [facility])    Jason Saldaña MD    Office Visit    5 months ago Type 2 diabetes mellitus without complication, without long-term current use of insulin [facility])    Jason Saldaña MD    Office Visit    9 months ago Type 2 diabetes mellitus without complication, without long-term current use of insulin [facility])    Jason Saldaña MD    Office Visit    1 year ago Essential hypertension    Mau Saldaña, Khurram Koenig MD    Office Visit    1 year ago Diabetes mellitus with no complication [facility])    Jason Saldaña MD    Virtual Phone E/M            Future Appointments         Provider Department Appt Notes    In 3 weeks Estee Herbert MD [address] [place] [address] [place], [address], [address] [address]

## 2022-01-15 ENCOUNTER — LAB ENCOUNTER (OUTPATIENT)
Dept: LAB | Age: 57
End: 2022-01-15
Attending: FAMILY MEDICINE
Payer: COMMERCIAL

## 2022-01-15 ENCOUNTER — OFFICE VISIT (OUTPATIENT)
Dept: FAMILY MEDICINE CLINIC | Facility: CLINIC | Age: 57
End: 2022-01-15
Payer: COMMERCIAL

## 2022-01-15 VITALS
HEART RATE: 61 BPM | DIASTOLIC BLOOD PRESSURE: 60 MMHG | WEIGHT: 158.19 LBS | BODY MASS INDEX: 26.35 KG/M2 | TEMPERATURE: 98 F | HEIGHT: 65 IN | SYSTOLIC BLOOD PRESSURE: 103 MMHG

## 2022-01-15 DIAGNOSIS — E11.9 TYPE 2 DIABETES MELLITUS WITHOUT COMPLICATION, WITHOUT LONG-TERM CURRENT USE OF INSULIN (HCC): ICD-10-CM

## 2022-01-15 DIAGNOSIS — E07.9 THYROID DISEASE: ICD-10-CM

## 2022-01-15 DIAGNOSIS — Z01.419 ENCOUNTER FOR WELL WOMAN EXAM WITH ROUTINE GYNECOLOGICAL EXAM: ICD-10-CM

## 2022-01-15 DIAGNOSIS — E78.5 HYPERLIPIDEMIA, UNSPECIFIED HYPERLIPIDEMIA TYPE: ICD-10-CM

## 2022-01-15 DIAGNOSIS — Z01.419 ENCOUNTER FOR WELL WOMAN EXAM WITH ROUTINE GYNECOLOGICAL EXAM: Primary | ICD-10-CM

## 2022-01-15 DIAGNOSIS — I10 ESSENTIAL HYPERTENSION: ICD-10-CM

## 2022-01-15 LAB
ALBUMIN SERPL-MCNC: 4.2 G/DL (ref 3.4–5)
ALBUMIN/GLOB SERPL: 1.2 {RATIO} (ref 1–2)
ALP LIVER SERPL-CCNC: 107 U/L
ALT SERPL-CCNC: 38 U/L
ANION GAP SERPL CALC-SCNC: 6 MMOL/L (ref 0–18)
AST SERPL-CCNC: 15 U/L (ref 15–37)
BASOPHILS # BLD AUTO: 0.03 X10(3) UL (ref 0–0.2)
BASOPHILS NFR BLD AUTO: 0.5 %
BILIRUB SERPL-MCNC: 0.6 MG/DL (ref 0.1–2)
BUN BLD-MCNC: 15 MG/DL (ref 7–18)
BUN/CREAT SERPL: 21.4 (ref 10–20)
CALCIUM BLD-MCNC: 9.7 MG/DL (ref 8.5–10.1)
CHLORIDE SERPL-SCNC: 106 MMOL/L (ref 98–112)
CHOLEST SERPL-MCNC: 132 MG/DL (ref ?–200)
CO2 SERPL-SCNC: 29 MMOL/L (ref 21–32)
CREAT BLD-MCNC: 0.7 MG/DL
CREAT UR-SCNC: 129 MG/DL
DEPRECATED RDW RBC AUTO: 43.4 FL (ref 35.1–46.3)
EOSINOPHIL # BLD AUTO: 0.17 X10(3) UL (ref 0–0.7)
EOSINOPHIL NFR BLD AUTO: 2.7 %
ERYTHROCYTE [DISTWIDTH] IN BLOOD BY AUTOMATED COUNT: 12.6 % (ref 11–15)
EST. AVERAGE GLUCOSE BLD GHB EST-MCNC: 146 MG/DL (ref 68–126)
FASTING PATIENT LIPID ANSWER: YES
FASTING STATUS PATIENT QL REPORTED: YES
GLOBULIN PLAS-MCNC: 3.4 G/DL (ref 2.8–4.4)
GLUCOSE BLD-MCNC: 116 MG/DL (ref 70–99)
HBA1C MFR BLD: 6.7 % (ref ?–5.7)
HCT VFR BLD AUTO: 43.9 %
HDLC SERPL-MCNC: 45 MG/DL (ref 40–59)
HGB BLD-MCNC: 14.1 G/DL
IMM GRANULOCYTES # BLD AUTO: 0.01 X10(3) UL (ref 0–1)
IMM GRANULOCYTES NFR BLD: 0.2 %
LDLC SERPL CALC-MCNC: 62 MG/DL (ref ?–100)
LYMPHOCYTES # BLD AUTO: 2.37 X10(3) UL (ref 1–4)
LYMPHOCYTES NFR BLD AUTO: 37.3 %
MCH RBC QN AUTO: 30.2 PG (ref 26–34)
MCHC RBC AUTO-ENTMCNC: 32.1 G/DL (ref 31–37)
MCV RBC AUTO: 94 FL
MICROALBUMIN UR-MCNC: 0.88 MG/DL
MICROALBUMIN/CREAT 24H UR-RTO: 6.8 UG/MG (ref ?–30)
MONOCYTES # BLD AUTO: 0.25 X10(3) UL (ref 0.1–1)
MONOCYTES NFR BLD AUTO: 3.9 %
NEUTROPHILS # BLD AUTO: 3.53 X10 (3) UL (ref 1.5–7.7)
NEUTROPHILS # BLD AUTO: 3.53 X10(3) UL (ref 1.5–7.7)
NEUTROPHILS NFR BLD AUTO: 55.4 %
NONHDLC SERPL-MCNC: 87 MG/DL (ref ?–130)
OSMOLALITY SERPL CALC.SUM OF ELEC: 294 MOSM/KG (ref 275–295)
PLATELET # BLD AUTO: 217 10(3)UL (ref 150–450)
POTASSIUM SERPL-SCNC: 4.5 MMOL/L (ref 3.5–5.1)
PROT SERPL-MCNC: 7.6 G/DL (ref 6.4–8.2)
RBC # BLD AUTO: 4.67 X10(6)UL
SODIUM SERPL-SCNC: 141 MMOL/L (ref 136–145)
TRIGL SERPL-MCNC: 147 MG/DL (ref 30–149)
TSI SER-ACNC: 1.08 MIU/ML (ref 0.36–3.74)
VIT B12 SERPL-MCNC: 610 PG/ML (ref 193–986)
VIT D+METAB SERPL-MCNC: 19.1 NG/ML (ref 30–100)
VLDLC SERPL CALC-MCNC: 22 MG/DL (ref 0–30)
WBC # BLD AUTO: 6.4 X10(3) UL (ref 4–11)

## 2022-01-15 PROCEDURE — 36415 COLL VENOUS BLD VENIPUNCTURE: CPT

## 2022-01-15 PROCEDURE — 3044F HG A1C LEVEL LT 7.0%: CPT | Performed by: FAMILY MEDICINE

## 2022-01-15 PROCEDURE — 80053 COMPREHEN METABOLIC PANEL: CPT

## 2022-01-15 PROCEDURE — 84443 ASSAY THYROID STIM HORMONE: CPT

## 2022-01-15 PROCEDURE — 3061F NEG MICROALBUMINURIA REV: CPT | Performed by: FAMILY MEDICINE

## 2022-01-15 PROCEDURE — 3078F DIAST BP <80 MM HG: CPT | Performed by: FAMILY MEDICINE

## 2022-01-15 PROCEDURE — 99396 PREV VISIT EST AGE 40-64: CPT | Performed by: FAMILY MEDICINE

## 2022-01-15 PROCEDURE — 83036 HEMOGLOBIN GLYCOSYLATED A1C: CPT

## 2022-01-15 PROCEDURE — 85025 COMPLETE CBC W/AUTO DIFF WBC: CPT

## 2022-01-15 PROCEDURE — 3008F BODY MASS INDEX DOCD: CPT | Performed by: FAMILY MEDICINE

## 2022-01-15 PROCEDURE — 82306 VITAMIN D 25 HYDROXY: CPT

## 2022-01-15 PROCEDURE — 82607 VITAMIN B-12: CPT

## 2022-01-15 PROCEDURE — 82570 ASSAY OF URINE CREATININE: CPT

## 2022-01-15 PROCEDURE — 3074F SYST BP LT 130 MM HG: CPT | Performed by: FAMILY MEDICINE

## 2022-01-15 PROCEDURE — 82043 UR ALBUMIN QUANTITATIVE: CPT

## 2022-01-15 PROCEDURE — 80061 LIPID PANEL: CPT

## 2022-01-15 RX ORDER — EMPAGLIFLOZIN 10 MG/1
10 TABLET, FILM COATED ORAL DAILY
Qty: 90 TABLET | Refills: 4 | Status: SHIPPED | OUTPATIENT
Start: 2022-01-15

## 2022-01-15 NOTE — PROGRESS NOTES
HPI:   Cristhian Young is a 55year old female who presents for a complete physical exam.   No LMP recorded. Patient is postmenopausal.  Reports glucose am less than 140s. Eating healthy. Tries to exercise but in the summer/nicer weather.    Wt Readings f FNA   • MASTECTOMY RIGHT  2002   • NEEDLE BIOPSY LEFT  05/21/2008    us guided   • REDUCTION LEFT  2002    lift      Family History   Problem Relation Age of Onset   • Hypertension Mother    • Lipids Mother    • Cancer Mother 80        pancreatic ca   • Rhetta Needs edema      ASSESSMENT AND PLAN:   Cristhian Young is a 55year old female who presents for a complete physical exam.   1. Encounter for well woman exam with routine gynecological exam    - CBC WITH DIFFERENTIAL WITH PLATELET;  Future  - COMP METABOLIC PANEL Question: Release to patient          Answer: Immediate      Hemoglobin A1C          Standing Status: Future          Standing Expiration Date: 1/15/2023      Microalb/Creat Ratio, Random Urine          Standing Status: Future          Standing Expiration

## 2022-01-17 LAB — HPV I/H RISK 1 DNA SPEC QL NAA+PROBE: NEGATIVE

## 2022-01-23 RX ORDER — ERGOCALCIFEROL 1.25 MG/1
50000 CAPSULE ORAL WEEKLY
Qty: 12 CAPSULE | Refills: 4 | Status: SHIPPED | OUTPATIENT
Start: 2022-01-23 | End: 2022-02-22

## 2022-01-23 NOTE — PROGRESS NOTES
Brandon Carvajal - Your diabetes is better controlled and normal cholesterol with current medications. Continue all the same.  Your vitamin D levels are low so will start you on weekly high dose vitamin D 50,000 units. - Dr. Neris Murphy

## 2022-01-27 ENCOUNTER — LAB ENCOUNTER (OUTPATIENT)
Dept: LAB | Age: 57
End: 2022-01-27
Attending: FAMILY MEDICINE
Payer: COMMERCIAL

## 2022-01-27 DIAGNOSIS — R14.0 ABDOMINAL BLOATING: ICD-10-CM

## 2022-01-27 PROCEDURE — 83013 H PYLORI (C-13) BREATH: CPT

## 2022-01-28 LAB — H. PYLORI BREATH TEST: NEGATIVE

## 2022-06-14 RX ORDER — GLIMEPIRIDE 1 MG/1
1 TABLET ORAL 2 TIMES DAILY
Qty: 180 TABLET | Refills: 1 | Status: SHIPPED | OUTPATIENT
Start: 2022-06-14 | End: 2022-06-18

## 2022-06-14 NOTE — TELEPHONE ENCOUNTER
Refill passed per [address] [place] protocol.     Requested Prescriptions   Pending Prescriptions Disp Refills    GLIMEPIRIDE 1 MG Oral Tab [Pharmacy Med Name: GLIMEPIRIDE 1MG TABLETS] 180 tablet 1     Sig: TAKE 1 TABLET(1 MG) BY MOUTH TWICE DAILY        Diabetes Medication Protocol Passed - 6/14/2022  5:47 AM        Passed - Last A1C < 7.5 and within past 6 months     Lab Results   Component Value Date    A1C 6.7 (H) 01/15/2022               Passed - Appointment in past 6 or next 3 months        Passed - GFR Non- > 50     Lab Results   Component Value Date    GFRNAA 98 01/15/2022                 Passed - GFR in the past 12 months              Recent Outpatient Visits              5 months ago Encounter for well woman exam with routine gynecological exam    Luis Fernando Davis MD    Office Visit    8 months ago Type 2 diabetes mellitus without complication, without long-term current use of insulin [facility])    [address] MD    Office Visit    11 months ago Type 2 diabetes mellitus without complication, without long-term current use of insulin [facility])    [address] MD    Office Visit    1 year ago Type 2 diabetes mellitus without complication, without long-term current use of insulin [facility])    [address] MD    Office Visit    1 year ago Essential hypertension    [address], Devorah Lopez MD    Office Visit            Future Appointments         Provider Department Appt Notes    In 4 days Mckenna Daniels MD [address], [place] [place] 6 month f/u - policy informed

## 2022-06-18 ENCOUNTER — OFFICE VISIT (OUTPATIENT)
Dept: FAMILY MEDICINE CLINIC | Facility: CLINIC | Age: 57
End: 2022-06-18
Payer: COMMERCIAL

## 2022-06-18 VITALS
WEIGHT: 154.19 LBS | BODY MASS INDEX: 25.69 KG/M2 | HEIGHT: 65 IN | TEMPERATURE: 98 F | SYSTOLIC BLOOD PRESSURE: 103 MMHG | HEART RATE: 63 BPM | DIASTOLIC BLOOD PRESSURE: 67 MMHG

## 2022-06-18 DIAGNOSIS — E11.9 TYPE 2 DIABETES MELLITUS WITHOUT COMPLICATION, WITHOUT LONG-TERM CURRENT USE OF INSULIN (HCC): Primary | ICD-10-CM

## 2022-06-18 DIAGNOSIS — E78.5 HYPERLIPIDEMIA, UNSPECIFIED HYPERLIPIDEMIA TYPE: ICD-10-CM

## 2022-06-18 LAB
CARTRIDGE LOT#: ABNORMAL NUMERIC
HEMOGLOBIN A1C: 6.7 % (ref 4.3–5.6)

## 2022-06-18 PROCEDURE — 83036 HEMOGLOBIN GLYCOSYLATED A1C: CPT | Performed by: FAMILY MEDICINE

## 2022-06-18 PROCEDURE — 3008F BODY MASS INDEX DOCD: CPT | Performed by: FAMILY MEDICINE

## 2022-06-18 PROCEDURE — 99213 OFFICE O/P EST LOW 20 MIN: CPT | Performed by: FAMILY MEDICINE

## 2022-06-18 PROCEDURE — 3078F DIAST BP <80 MM HG: CPT | Performed by: FAMILY MEDICINE

## 2022-06-18 PROCEDURE — 3044F HG A1C LEVEL LT 7.0%: CPT | Performed by: FAMILY MEDICINE

## 2022-06-18 PROCEDURE — 3074F SYST BP LT 130 MM HG: CPT | Performed by: FAMILY MEDICINE

## 2022-06-18 RX ORDER — ATORVASTATIN CALCIUM 20 MG/1
20 TABLET, FILM COATED ORAL DAILY
Qty: 90 TABLET | Refills: 4 | Status: SHIPPED | OUTPATIENT
Start: 2022-06-18

## 2022-06-18 RX ORDER — GLIMEPIRIDE 1 MG/1
1 TABLET ORAL 2 TIMES DAILY
Qty: 180 TABLET | Refills: 4 | Status: SHIPPED | OUTPATIENT
Start: 2022-06-18

## 2022-06-18 RX ORDER — LEVOTHYROXINE SODIUM 0.05 MG/1
50 TABLET ORAL DAILY
Qty: 90 TABLET | Refills: 4 | Status: SHIPPED | OUTPATIENT
Start: 2022-06-18

## 2022-06-18 RX ORDER — LISINOPRIL 40 MG/1
40 TABLET ORAL DAILY
Qty: 90 TABLET | Refills: 4 | Status: SHIPPED | OUTPATIENT
Start: 2022-06-18

## 2022-06-18 RX ORDER — EMPAGLIFLOZIN 10 MG/1
10 TABLET, FILM COATED ORAL DAILY
Qty: 90 TABLET | Refills: 4 | Status: SHIPPED | OUTPATIENT
Start: 2022-06-18 | End: 2022-06-18

## 2022-06-18 RX ORDER — GLIMEPIRIDE 1 MG/1
1 TABLET ORAL 2 TIMES DAILY
Qty: 180 TABLET | Refills: 1 | Status: SHIPPED | OUTPATIENT
Start: 2022-06-18 | End: 2022-06-18

## 2022-06-18 RX ORDER — EMPAGLIFLOZIN 10 MG/1
10 TABLET, FILM COATED ORAL DAILY
Qty: 90 TABLET | Refills: 4 | Status: SHIPPED | OUTPATIENT
Start: 2022-06-18

## 2022-06-19 ENCOUNTER — WALK IN (OUTPATIENT)
Dept: URGENT CARE | Age: 57
End: 2022-06-19
Attending: EMERGENCY MEDICINE

## 2022-06-19 VITALS
TEMPERATURE: 97.6 F | DIASTOLIC BLOOD PRESSURE: 63 MMHG | HEART RATE: 77 BPM | SYSTOLIC BLOOD PRESSURE: 115 MMHG | RESPIRATION RATE: 77 BRPM | OXYGEN SATURATION: 97 %

## 2022-06-19 DIAGNOSIS — S61.012A LACERATION OF LEFT THUMB WITHOUT FOREIGN BODY WITHOUT DAMAGE TO NAIL, INITIAL ENCOUNTER: Primary | ICD-10-CM

## 2022-06-19 PROCEDURE — 90471 IMMUNIZATION ADMIN: CPT | Performed by: EMERGENCY MEDICINE

## 2022-06-19 PROCEDURE — 10002800 HB RX 250 W HCPCS: Performed by: EMERGENCY MEDICINE

## 2022-06-19 PROCEDURE — 90715 TDAP VACCINE 7 YRS/> IM: CPT | Performed by: EMERGENCY MEDICINE

## 2022-06-19 PROCEDURE — 12001 RPR S/N/AX/GEN/TRNK 2.5CM/<: CPT

## 2022-06-19 PROCEDURE — 99202 OFFICE O/P NEW SF 15 MIN: CPT

## 2022-06-19 RX ORDER — LISINOPRIL 40 MG/1
TABLET ORAL
COMMUNITY
Start: 2022-06-14

## 2022-06-19 RX ORDER — LEVOTHYROXINE SODIUM 0.05 MG/1
TABLET ORAL
COMMUNITY
Start: 2022-06-14

## 2022-06-19 RX ORDER — GLIMEPIRIDE 1 MG/1
TABLET ORAL
COMMUNITY
Start: 2022-06-14

## 2022-06-19 RX ORDER — ATORVASTATIN CALCIUM 20 MG/1
TABLET, FILM COATED ORAL
COMMUNITY
Start: 2022-06-14

## 2022-06-19 RX ORDER — CEPHALEXIN 500 MG/1
500 CAPSULE ORAL 2 TIMES DAILY
Qty: 14 CAPSULE | Refills: 0 | Status: SHIPPED | OUTPATIENT
Start: 2022-06-19 | End: 2022-06-26

## 2022-06-19 RX ADMIN — TETANUS TOXOID, REDUCED DIPHTHERIA TOXOID AND ACELLULAR PERTUSSIS VACCINE, ADSORBED 0.5 ML: 5; 2.5; 8; 8; 2.5 SUSPENSION INTRAMUSCULAR at 19:15

## 2022-06-19 ASSESSMENT — ENCOUNTER SYMPTOMS
ABDOMINAL PAIN: 0
SINUS PAIN: 0
CONFUSION: 0
WOUND: 1
NAUSEA: 0
HEADACHES: 0
SHORTNESS OF BREATH: 0
BACK PAIN: 0
FEVER: 0
CHILLS: 0
VOMITING: 0
COUGH: 0

## 2022-06-19 ASSESSMENT — PAIN SCALES - GENERAL: PAINLEVEL: 6

## 2022-09-26 ENCOUNTER — TELEPHONE (OUTPATIENT)
Dept: FAMILY MEDICINE CLINIC | Facility: CLINIC | Age: 57
End: 2022-09-26

## 2022-09-26 DIAGNOSIS — Z85.3 HISTORY OF BREAST CANCER: ICD-10-CM

## 2022-09-26 DIAGNOSIS — Z12.31 VISIT FOR SCREENING MAMMOGRAM: Primary | ICD-10-CM

## 2022-09-26 NOTE — TELEPHONE ENCOUNTER
Emirati speaking - pt would like an order for preventative mammogram for left breast only since she does not have a right one. Pt was told by [facility] department to order early since the labs fill up at the end of the year.  Please advise

## 2022-10-16 RX ORDER — ATORVASTATIN CALCIUM 20 MG/1
20 TABLET, FILM COATED ORAL DAILY
Qty: 90 TABLET | Refills: 1 | Status: SHIPPED | OUTPATIENT
Start: 2022-10-16

## 2022-10-16 RX ORDER — LEVOTHYROXINE SODIUM 0.05 MG/1
50 TABLET ORAL DAILY
Qty: 90 TABLET | Refills: 1 | Status: SHIPPED | OUTPATIENT
Start: 2022-10-16

## 2022-10-16 NOTE — TELEPHONE ENCOUNTER
Please review. Protocol Failed or has No Protocol.     Requested Prescriptions   Pending Prescriptions Disp Refills    LEVOTHYROXINE 50 MCG Oral Tab [Pharmacy Med Name: LEVOTHYROXINE 0.05MG (50MCG) TAB] 90 tablet 4     Sig: TAKE 1 TABLET(50 MCG) BY MOUTH DAILY        Thyroid Medication Protocol Passed - 10/15/2022  5:43 AM        Passed - TSH in past 12 months        Passed - Last TSH value is normal     Lab Results   Component Value Date    TSH 1.080 01/15/2022                 Passed - In person appointment or virtual visit in the past 12 mos or appointment in next 3 mos       Recent Outpatient Visits              4 months ago Type 2 diabetes mellitus without complication, without long-term current use of insulin [facility])    [address] MD    Office Visit    9 months ago Encounter for well woman exam with routine gynecological exam    [address] MD    Office Visit    1 year ago Type 2 diabetes mellitus without complication, without long-term current use of insulin [facility])    [address] Cletis Shank, MD    Office Visit    1 year ago Type 2 diabetes mellitus without complication, without long-term current use of insulin [facility])    [address] Cletis Shank, MD    Office Visit    1 year ago Type 2 diabetes mellitus without complication, without long-term current use of insulin [facility])    [address] Cletis Shank, MD    Office Visit     Future Appointments         Provider Department Appt Notes    In 3 weeks ADO DEXA RM1; ADO DELONTE 600 [facility]     In 2 months Reshma Quinn MD [facility], [facility], [address], [place] 6 mos follow up  Policy informed                  METFORMIN HCL 1000 MG Oral Tab [Pharmacy Med Name: METFORMIN 1000MG TABLETS] 180 tablet 4     Sig: TAKE 1 TABLET(1000 MG) BY MOUTH TWICE DAILY        Diabetes Medication Protocol Passed - 10/15/2022  5:43 AM        Passed - Last A1C < 7.5 and within past 6 months     Lab Results   Component Value Date    A1C 6.7 (A) 06/18/2022               Passed - In person appointment or virtual visit in the past 6 mos or appointment in next 3 mos       Recent Outpatient Visits              4 months ago Type 2 diabetes mellitus without complication, without long-term current use of insulin [facility])    [address], Last Elena MD    Office Visit    9 months ago Encounter for well woman exam with routine gynecological exam    [address], Ofelia Lassiter MD    Office Visit    1 year ago Type 2 diabetes mellitus without complication, without long-term current use of insulin [facility])    [address], Ofelia Lassiter MD    Office Visit    1 year ago Type 2 diabetes mellitus without complication, without long-term current use of insulin [facility])    [address], Ofelia Lsasiter MD    Office Visit    1 year ago Type 2 diabetes mellitus without complication, without long-term current use of insulin [facility])    [address], Ofelia Lassiter MD    Office Visit     Future Appointments         Provider Department Appt Notes    In 3 weeks ADO DEXA RM1; ADO DELONTE 600 [facility]     In 2 months Gerardo Perrin MD [address], [place] 6 mos follow up  Policy informed               Passed - EGFRCR or GFRNAA > 50     GFR Evaluation  GFRNAA: 98 , resulted on 1/15/2022            Passed - GFR in the past 12 months           LISINOPRIL 40 MG Oral Tab [Pharmacy Med Name: LISINOPRIL 40MG TABLETS] 90 tablet 4     Sig: TAKE 1 TABLET(40 MG) BY MOUTH DAILY        Hypertensive Medications Protocol Failed - 10/15/2022  5:43 AM        Failed - CMP or BMP in past 6 months     No results found for this or any previous visit (from the past 22 [id number] hour(s)).               Passed - In person appointment in the past 12 or next 3 months       Recent Outpatient Visits              4 months ago Type 2 diabetes mellitus without complication, without long-term current use of insulin [facility])    [address]ega MD    Office Visit    9 months ago Encounter for well woman exam with routine gynecological exam    [address] Erlene Lesser, MD    Office Visit    1 year ago Type 2 diabetes mellitus without complication, without long-term current use of insulin [facility])    [address] Erlene Lesser, MD    Office Visit    1 year ago Type 2 diabetes mellitus without complication, without long-term current use of insulin [facility])    [employer], Marjan Oneal Erlene Lesser, MD    Office Visit    1 year ago Type 2 diabetes mellitus without complication, without long-term current use of insulin [facility])    [employer], Marjan One[address] Erlene Lesser, MD    Office Visit     Future Appointments         Provider Department Appt Notes    In 3 weeks ADO DEXA RM1; ADO DELONTE 600 [facility]     In 2 months Haseeb Mcdonald MD [employer], [address], [place] 6 mos follow up  Policy informed               Passed - Last BP reading less than 140/90     BP Readings from Last 1 Encounters:  06/18/22 : 103/67                Passed - In person appointment or virtual visit in the past 6 months       Recent Outpatient Visits              4 months ago Type 2 diabetes mellitus without complication, without long-term current use of insulin [facility])    [address] Erlene Lesser, MD    Office Visit    9 months ago Encounter for well woman exam with routine gynecological exam    Miranda Paredes MD    Office Visit    1 year ago Type 2 diabetes mellitus without complication, without long-term current use of insulin [facility])    Randa Huang MD    Office Visit    1 year ago Type 2 diabetes mellitus without complication, without long-term current use of insulin [facility])    [address], Bev Colunga MD    Office Visit    1 year ago Type 2 diabetes mellitus without complication, without long-term current use of insulin [facility])    [address], Bev Garcia MD    Office Visit     Future Appointments         Provider Department Appt Notes    In 3 weeks ADO DEXA RM1; ADO DELONTE 600 [facility]     In 2 months Paty Zhang MD [address], [place] 6 mos follow up  Policy informed               Passed - [facility] or GFRNAA > 50     GFR Evaluation  GFRNAA: 98 , resulted on 1/15/2022               ATORVASTATIN 20 MG Oral Tab [Pharmacy Med Name: ATORVASTATIN 20MG TABLETS] 90 tablet 4     Sig: TAKE 1 TABLET(20 MG) BY MOUTH DAILY        Cholesterol Medication Protocol Passed - 10/15/2022  5:43 AM        Passed - ALT in past 12 months        Passed - LDL in past 12 months        Passed - Last ALT < 80       Lab Results   Component Value Date    ALT 38 01/15/2022             Passed - Last LDL < 130     Lab Results   Component Value Date    LDL 62 01/15/2022               Passed - In person appointment or virtual visit in the past 12 mos or appointment in next 3 mos       Recent Outpatient Visits              4 months ago Type 2 diabetes mellitus without complication, without long-term current use of insulin [facility])    Randa Huang MD    Office Visit    9 months ago Encounter for well woman exam with routine gynecological exam    Bev Lynn MD    Office Visit    1 year ago Type 2 diabetes mellitus without complication, without long-term current use of insulin [facility])    [address], Jewel York MD    Office Visit    1 year ago Type 2 diabetes mellitus without complication, without long-term current use of insulin [facility])    [address] [place] [address], Nitza Maldonado MD    Office Visit    1 year ago Type 2 diabetes mellitus without complication, without long-term current use of insulin [facility])    [address] [place] Thomasena Maki, MD    Office Visit     Future Appointments         Provider Department Appt Notes    In 3 weeks ADO DEXA RM1; ADO DELONTE 600 [facility]     In 2 months Tori Khan MD [address], [place] 6 mos follow up  Policy informed                     Future Appointments         Provider Department Appt Notes    In 3 weeks ADO DEXA RM1; ADO DELONTE 600 [facility]     In 2 months Tori Khan MD [address] [place] [address], [place] 6 mos follow up  Policy informed            Recent Outpatient Visits              4 months ago Type 2 diabetes mellitus without complication, without long-term current use of insulin [facility])    Augusto Hoang Thomasena Maki, MD    Office Visit    9 months ago Encounter for well woman exam with routine gynecological exam    Nitza Magaña MD    Office Visit    1 year ago Type 2 diabetes mellitus without complication, without long-term current use of insulin [facility])    [address] Ho[address] Thomasena Maki, MD    Office Visit    1 year ago Type 2 diabetes mellitus without complication, without long-term current use of insulin [facility])    [address] Thomasena Maki, MD    Office Visit    1 year ago Type 2 diabetes mellitus without complication, without long-term current use of insulin [facility])    [address] Ho[address] Thomasena Maki, MD    Office Visit

## 2022-10-18 RX ORDER — LISINOPRIL 40 MG/1
40 TABLET ORAL DAILY
Qty: 90 TABLET | Refills: 1 | Status: SHIPPED | OUTPATIENT
Start: 2022-10-18

## 2022-11-12 ENCOUNTER — HOSPITAL ENCOUNTER (OUTPATIENT)
Dept: MAMMOGRAPHY | Age: 57
Discharge: HOME | End: 2022-11-12
Attending: FAMILY MEDICINE
Payer: COMMERCIAL

## 2022-11-12 DIAGNOSIS — Z12.31 VISIT FOR SCREENING MAMMOGRAM: ICD-10-CM

## 2022-11-12 PROCEDURE — 77063 BREAST TOMOSYNTHESIS BI: CPT | Performed by: FAMILY MEDICINE

## 2022-11-12 PROCEDURE — 77067 SCR MAMMO BI INCL CAD: CPT | Performed by: FAMILY MEDICINE

## 2023-01-07 ENCOUNTER — LAB ENCOUNTER (OUTPATIENT)
Dept: LAB | Age: 58
End: 2023-01-07
Attending: FAMILY MEDICINE
Payer: COMMERCIAL

## 2023-01-07 ENCOUNTER — OFFICE VISIT (OUTPATIENT)
Dept: FAMILY MEDICINE CLINIC | Facility: CLINIC | Age: 58
End: 2023-01-07
Payer: COMMERCIAL

## 2023-01-07 VITALS
WEIGHT: 156.19 LBS | SYSTOLIC BLOOD PRESSURE: 121 MMHG | BODY MASS INDEX: 26.02 KG/M2 | HEART RATE: 62 BPM | TEMPERATURE: 97 F | HEIGHT: 65 IN | DIASTOLIC BLOOD PRESSURE: 76 MMHG

## 2023-01-07 DIAGNOSIS — I10 ESSENTIAL HYPERTENSION: ICD-10-CM

## 2023-01-07 DIAGNOSIS — E55.9 VITAMIN D DEFICIENCY: ICD-10-CM

## 2023-01-07 DIAGNOSIS — E11.9 TYPE 2 DIABETES MELLITUS WITHOUT COMPLICATION, WITHOUT LONG-TERM CURRENT USE OF INSULIN (HCC): Primary | ICD-10-CM

## 2023-01-07 DIAGNOSIS — E11.9 TYPE 2 DIABETES MELLITUS WITHOUT COMPLICATION, WITHOUT LONG-TERM CURRENT USE OF INSULIN (HCC): ICD-10-CM

## 2023-01-07 DIAGNOSIS — Z90.11 HISTORY OF RIGHT MASTECTOMY: ICD-10-CM

## 2023-01-07 DIAGNOSIS — M25.511 ACUTE PAIN OF RIGHT SHOULDER: ICD-10-CM

## 2023-01-07 LAB
ALBUMIN SERPL-MCNC: 4.2 G/DL (ref 3.4–5)
ALBUMIN/GLOB SERPL: 1.2 {RATIO} (ref 1–2)
ALP LIVER SERPL-CCNC: 104 U/L
ALT SERPL-CCNC: 32 U/L
ANION GAP SERPL CALC-SCNC: 7 MMOL/L (ref 0–18)
AST SERPL-CCNC: 17 U/L (ref 15–37)
BASOPHILS # BLD AUTO: 0.03 X10(3) UL (ref 0–0.2)
BASOPHILS NFR BLD AUTO: 0.5 %
BILIRUB SERPL-MCNC: 0.9 MG/DL (ref 0.1–2)
BUN BLD-MCNC: 16 MG/DL (ref 7–18)
BUN/CREAT SERPL: 22.2 (ref 10–20)
CALCIUM BLD-MCNC: 9.5 MG/DL (ref 8.5–10.1)
CARTRIDGE LOT#: ABNORMAL NUMERIC
CHLORIDE SERPL-SCNC: 104 MMOL/L (ref 98–112)
CHOLEST SERPL-MCNC: 103 MG/DL (ref ?–200)
CO2 SERPL-SCNC: 27 MMOL/L (ref 21–32)
CREAT BLD-MCNC: 0.72 MG/DL
CREAT UR-SCNC: 157 MG/DL
DEPRECATED RDW RBC AUTO: 40.1 FL (ref 35.1–46.3)
EOSINOPHIL # BLD AUTO: 0.07 X10(3) UL (ref 0–0.7)
EOSINOPHIL NFR BLD AUTO: 1.1 %
ERYTHROCYTE [DISTWIDTH] IN BLOOD BY AUTOMATED COUNT: 11.9 % (ref 11–15)
FASTING PATIENT LIPID ANSWER: YES
FASTING STATUS PATIENT QL REPORTED: YES
GFR SERPLBLD BASED ON 1.73 SQ M-ARVRAT: 98 ML/MIN/1.73M2 (ref 60–?)
GLOBULIN PLAS-MCNC: 3.6 G/DL (ref 2.8–4.4)
GLUCOSE BLD-MCNC: 93 MG/DL (ref 70–99)
HCT VFR BLD AUTO: 44.6 %
HDLC SERPL-MCNC: 44 MG/DL (ref 40–59)
HEMOGLOBIN A1C: 6.7 % (ref 4.3–5.6)
HGB BLD-MCNC: 14.7 G/DL
IMM GRANULOCYTES # BLD AUTO: 0.01 X10(3) UL (ref 0–1)
IMM GRANULOCYTES NFR BLD: 0.2 %
LDLC SERPL CALC-MCNC: 37 MG/DL (ref ?–100)
LYMPHOCYTES # BLD AUTO: 2.63 X10(3) UL (ref 1–4)
LYMPHOCYTES NFR BLD AUTO: 40.5 %
MCH RBC QN AUTO: 30.1 PG (ref 26–34)
MCHC RBC AUTO-ENTMCNC: 33 G/DL (ref 31–37)
MCV RBC AUTO: 91.2 FL
MICROALBUMIN UR-MCNC: 1.03 MG/DL
MICROALBUMIN/CREAT 24H UR-RTO: 6.6 UG/MG (ref ?–30)
MONOCYTES # BLD AUTO: 0.31 X10(3) UL (ref 0.1–1)
MONOCYTES NFR BLD AUTO: 4.8 %
NEUTROPHILS # BLD AUTO: 3.44 X10 (3) UL (ref 1.5–7.7)
NEUTROPHILS # BLD AUTO: 3.44 X10(3) UL (ref 1.5–7.7)
NEUTROPHILS NFR BLD AUTO: 52.9 %
NONHDLC SERPL-MCNC: 59 MG/DL (ref ?–130)
OSMOLALITY SERPL CALC.SUM OF ELEC: 287 MOSM/KG (ref 275–295)
PLATELET # BLD AUTO: 229 10(3)UL (ref 150–450)
POTASSIUM SERPL-SCNC: 4.2 MMOL/L (ref 3.5–5.1)
PROT SERPL-MCNC: 7.8 G/DL (ref 6.4–8.2)
RBC # BLD AUTO: 4.89 X10(6)UL
SODIUM SERPL-SCNC: 138 MMOL/L (ref 136–145)
TRIGL SERPL-MCNC: 125 MG/DL (ref 30–149)
TSI SER-ACNC: 0.78 MIU/ML (ref 0.36–3.74)
VIT D+METAB SERPL-MCNC: 94 NG/ML (ref 30–100)
VLDLC SERPL CALC-MCNC: 17 MG/DL (ref 0–30)
WBC # BLD AUTO: 6.5 X10(3) UL (ref 4–11)

## 2023-01-07 PROCEDURE — 3074F SYST BP LT 130 MM HG: CPT | Performed by: FAMILY MEDICINE

## 2023-01-07 PROCEDURE — 36415 COLL VENOUS BLD VENIPUNCTURE: CPT

## 2023-01-07 PROCEDURE — 3008F BODY MASS INDEX DOCD: CPT | Performed by: FAMILY MEDICINE

## 2023-01-07 PROCEDURE — 3078F DIAST BP <80 MM HG: CPT | Performed by: FAMILY MEDICINE

## 2023-01-07 PROCEDURE — 82306 VITAMIN D 25 HYDROXY: CPT

## 2023-01-07 PROCEDURE — 3061F NEG MICROALBUMINURIA REV: CPT | Performed by: FAMILY MEDICINE

## 2023-01-07 PROCEDURE — 3044F HG A1C LEVEL LT 7.0%: CPT | Performed by: FAMILY MEDICINE

## 2023-01-07 PROCEDURE — 84443 ASSAY THYROID STIM HORMONE: CPT

## 2023-01-07 PROCEDURE — 99214 OFFICE O/P EST MOD 30 MIN: CPT | Performed by: FAMILY MEDICINE

## 2023-01-07 PROCEDURE — 80053 COMPREHEN METABOLIC PANEL: CPT

## 2023-01-07 PROCEDURE — 85025 COMPLETE CBC W/AUTO DIFF WBC: CPT

## 2023-01-07 PROCEDURE — 83036 HEMOGLOBIN GLYCOSYLATED A1C: CPT | Performed by: FAMILY MEDICINE

## 2023-01-07 PROCEDURE — 82043 UR ALBUMIN QUANTITATIVE: CPT

## 2023-01-07 PROCEDURE — 80061 LIPID PANEL: CPT

## 2023-01-07 PROCEDURE — 82570 ASSAY OF URINE CREATININE: CPT

## 2023-01-11 NOTE — PROGRESS NOTES
Brandon Carvajal - Your labs look good except close to max level of vitamin D.  Please cut back to half on your supplements. - Dr. Hayes Fam

## 2023-04-19 RX ORDER — LEVOTHYROXINE SODIUM 0.05 MG/1
50 TABLET ORAL DAILY
Qty: 90 TABLET | Refills: 3 | Status: SHIPPED | OUTPATIENT
Start: 2023-04-19

## 2023-04-19 RX ORDER — ATORVASTATIN CALCIUM 20 MG/1
20 TABLET, FILM COATED ORAL DAILY
Qty: 90 TABLET | Refills: 3 | Status: SHIPPED | OUTPATIENT
Start: 2023-04-19

## 2023-04-19 RX ORDER — LISINOPRIL 40 MG/1
40 TABLET ORAL DAILY
Qty: 90 TABLET | Refills: 3 | Status: SHIPPED | OUTPATIENT
Start: 2023-04-19

## 2023-04-19 NOTE — TELEPHONE ENCOUNTER
Refill passed per [facility] protocol.   Requested Prescriptions   Pending Prescriptions Disp Refills    LEVOTHYROXINE 50 MCG Oral Tab [Pharmacy Med Name: LEVOTHYROXINE 0.05MG (50MCG) TAB] 90 tablet 1     Sig: TAKE 1 TABLET(50 MCG) BY MOUTH DAILY       Thyroid Medication Protocol Passed - 4/18/2023  5:43 AM        Passed - TSH in past 12 months        Passed - Last TSH value is normal     Lab Results   Component Value Date    TSH 0.776 01/07/2023                 Passed - In person appointment or virtual visit in the past 12 mos or appointment in next 3 mos     Recent Outpatient Visits              3 months ago Type 2 diabetes mellitus without complication, without long-term current use of insulin [facility]    [address] MD    Office Visit    10 months ago Type 2 diabetes mellitus without complication, without long-term current use of insulin [facility])    [address] MD    Office Visit    1 year ago Encounter for well woman exam with routine gynecological exam    [address] MD    Office Visit    1 year ago Type 2 diabetes mellitus without complication, without long-term current use of insulin [facility])    [address] MD    Office Visit    1 year ago Type 2 diabetes mellitus without complication, without long-term current use of insulin [facility])    [address], Mirlande Chris MD    Office Visit          Future Appointments         Provider Department Appt Notes    In 1 month Rossana Lopez MD [address] [facility],[address], [address], [place] px (policy informed)                 METFORMIN HCL 1000 MG Oral Tab [Pharmacy Med Name: METFORMIN 1000MG TABLETS] 180 tablet 1     Sig: TAKE 1 TABLET(1000 MG) BY MOUTH TWICE DAILY       Diabetes Medication Protocol Passed - 4/18/2023  5:43 AM        Passed - Last A1C < 7.5 and within past 6 months     Lab Results   Component Value Date    A1C 6.7 (A) 01/07/2023               Passed - In person appointment or virtual visit in the past 6 mos or appointment in next 3 mos     Recent Outpatient Visits              3 months ago Type 2 diabetes mellitus without complication, without long-term current use of insulin ([facility]    Libby Alexis MD    Office Visit    10 months ago Type 2 diabetes mellitus without complication, without long-term current use of insulin [facility])    Libby Alexis MD    Office Visit    1 year ago Encounter for well woman exam with routine gynecological exam    Libby Alexis MD    Office Visit    1 year ago Type 2 diabetes mellitus without complication, without long-term current use of insulin [facility])    Libby Alexis MD    Office Visit    1 year ago Type 2 diabetes mellitus without complication, without long-term current use of insulin [facility])    Christen Alexis, Lisette Ferguson MD    Office Visit          Future Appointments         Provider Department Appt Notes    In 1 month MD Eder Garrison [place] px (policy informed)               Passed - EGFRCR or GFRNAA > 50     GFR Evaluation  EGFRCR: 98 , resulted on 1/7/2023            Passed - GFR in the past 12 months          LISINOPRIL 40 MG Oral Tab [Pharmacy Med Name: LISINOPRIL 40MG TABLETS] 90 tablet 1     Sig: TAKE 1 TABLET(40 MG) BY MOUTH DAILY       Hypertensive Medications Protocol Passed - 4/18/2023  5:43 AM        Passed - In person appointment in the past 12 or next 3 months     Recent Outpatient Visits              3 months ago Type 2 diabetes mellitus without complication, without long-term current use of insulin (HCC)    Brigida Stratton MD    Office Visit    10 months ago Type 2 diabetes mellitus without complication, without long-term current use of insulin [facility])    [address], Brigida Garvin MD    Office Visit    1 year ago Encounter for well woman exam with routine gynecological exam    Kim Hannon MD    Office Visit    1 year ago Type 2 diabetes mellitus without complication, without long-term current use of insulin [facility])    Brigida Stratton MD    Office Visit    1 year ago Type 2 diabetes mellitus without complication, without long-term current use of insulin [facility])    Marjan Stratton, Miranda Maria MD    Office Visit          Future Appointments         Provider Department Appt Notes    In 1 month MD Will Engle [place] px (policy informed)               Passed - Last BP reading less than 140/90     BP Readings from Last 1 Encounters:  01/07/23 : 121/76                Passed - CMP or BMP in past 6 months     Recent Results (from the past 4392 hour(s))   COMP METABOLIC PANEL (14)    Collection Time: 01/07/23 10:24 AM   Result Value Ref Range    Glucose 93 70 - 99 mg/dL    Sodium 138 136 - 145 mmol/L    Potassium 4.2 3.5 - 5.1 mmol/L    Chloride 104 98 - 112 mmol/L    CO2 27.0 21.0 - 32.0 mmol/L    Anion Gap 7 0 - 18 mmol/L    BUN 16 7 - 18 mg/dL    Creatinine 0.72 0.55 - 1.02 mg/dL    BUN/CREA Ratio 22.2 (H) 10.0 - 20.0    Calcium, Total 9.5 8.5 - 10.1 mg/dL    Calculated Osmolality 287 275 - 295 mOsm/kg    eGFR-Cr 98 >=60 mL/min/1.73m2    ALT 32 13 - 56 U/L    AST 17 15 - 37 U/L    Alkaline Phosphatase 104 46 - 118 U/L    Bilirubin, Total 0.9 0.1 - 2.0 mg/dL Total Protein 7.8 6.4 - 8.2 g/dL    Albumin 4.2 3.4 - 5.0 g/dL    Globulin  3.6 2.8 - 4.4 g/dL    A/G Ratio 1.2 1.0 - 2.0    Patient Fasting for CMP? Yes      *Note: Due to a large number of results and/or encounters for the requested time period, some results have not been displayed. A complete set of results can be found in Results Review.                  Passed - In person appointment or virtual visit in the past 6 months     Recent Outpatient Visits              3 months ago Type 2 diabetes mellitus without complication, without long-term current use of insulin ([facility] [facility]    Mary Ann Santiago MD    Office Visit    10 months ago Type 2 diabetes mellitus without complication, without long-term current use of insulin [facility])    Mary Ann Santiago MD    Office Visit    1 year ago Encounter for well woman exam with routine gynecological exam    Bishop Lara MD    Office Visit    1 year ago Type 2 diabetes mellitus without complication, without long-term current use of insulin [facility])    Mary Ann Santiago MD    Office Visit    1 year ago Type 2 diabetes mellitus without complication, without long-term current use of insulin [facility])    Fermin Santiago, Jaspreet Mcnamara MD    Office Visit          Future Appointments         Provider Department Appt Notes    In 1 month MD Laura Ramires [place] px (policy informed)               Passed - EGFRCR or GFRNAA > 50     GFR Evaluation  EGFRCR: 98 , resulted on 1/7/2023              ATORVASTATIN 20 MG Oral Tab [Pharmacy Med Name: ATORVASTATIN 20MG TABLETS] 90 tablet 1     Sig: TAKE 1 TABLET(20 MG) BY MOUTH DAILY       Cholesterol Medication Protocol Passed - 4/18/2023  5:43 AM        Passed - ALT in past 12 months        Passed - LDL in past 12 months        Passed - Last ALT < 80     Lab Results   Component Value Date    ALT 32 01/07/2023             Passed - Last LDL < 130     Lab Results   Component Value Date    LDL 37 01/07/2023               Passed - In person appointment or virtual visit in the past 12 mos or appointment in next 3 mos     Recent Outpatient Visits              3 months ago Type 2 diabetes mellitus without complication, without long-term current use of insulin ([address]    [address] MD    Office Visit    10 months ago Type 2 diabetes mellitus without complication, without long-term current use of insulin [facility])    [address] MD    Office Visit    1 year ago Encounter for well woman exam with routine gynecological exam    [address] Mckeon MD    Office Visit    1 year ago Type 2 diabetes mellitus without complication, without long-term current use of insulin [facility])    [address] MD    Office Visit    1 year ago Type 2 diabetes mellitus without complication, without long-term current use of insulin [facility])    [address]ke[address] MD    Office Visit          Future Appointments         Provider Department Appt Notes    In 1 month Tommy Koo MD [address], [place] (policy informed)                  Recent Outpatient Visits              3 months ago Type 2 diabetes mellitus without complication, without long-term current use of insulin [facility])    Yoseph Kebede MD    Office Visit    10 months ago Type 2 diabetes mellitus without complication, without long-term current use of insulin [facility])    [address], [address], Mary Ann Ceballos MD    Office Visit    1 year ago Encounter for well woman exam with routine gynecological exam    Mary Ann Santiago MD    Office Visit    1 year ago Type 2 diabetes mellitus without complication, without long-term current use of insulin [facility])    Mary Ann Santiago MD    Office Visit    1 year ago Type 2 diabetes mellitus without complication, without long-term current use of insulin [facility])    Laura Piper, Fermin Shafer, Jaspreet Mcnamara MD    Office Visit          Future Appointments         Provider Department Appt Notes    In 1 month Shahzad Murphy MD [address], [address], [place] px (policy informed)

## 2023-06-17 ENCOUNTER — OFFICE VISIT (OUTPATIENT)
Dept: FAMILY MEDICINE CLINIC | Facility: CLINIC | Age: 58
End: 2023-06-17

## 2023-06-17 ENCOUNTER — LAB ENCOUNTER (OUTPATIENT)
Dept: LAB | Age: 58
End: 2023-06-17
Attending: FAMILY MEDICINE
Payer: COMMERCIAL

## 2023-06-17 VITALS
HEIGHT: 65 IN | HEART RATE: 59 BPM | DIASTOLIC BLOOD PRESSURE: 71 MMHG | BODY MASS INDEX: 26.49 KG/M2 | SYSTOLIC BLOOD PRESSURE: 113 MMHG | WEIGHT: 159 LBS

## 2023-06-17 DIAGNOSIS — Z00.00 ROUTINE MEDICAL EXAM: ICD-10-CM

## 2023-06-17 DIAGNOSIS — E55.9 VITAMIN D DEFICIENCY: ICD-10-CM

## 2023-06-17 DIAGNOSIS — E11.9 TYPE 2 DIABETES MELLITUS WITHOUT COMPLICATION, WITHOUT LONG-TERM CURRENT USE OF INSULIN (HCC): Primary | ICD-10-CM

## 2023-06-17 DIAGNOSIS — Z90.11 HISTORY OF RIGHT MASTECTOMY: ICD-10-CM

## 2023-06-17 DIAGNOSIS — I10 ESSENTIAL HYPERTENSION: ICD-10-CM

## 2023-06-17 DIAGNOSIS — Z12.31 SCREENING MAMMOGRAM FOR BREAST CANCER: ICD-10-CM

## 2023-06-17 DIAGNOSIS — E07.9 THYROID DISEASE: ICD-10-CM

## 2023-06-17 DIAGNOSIS — Z85.3 HISTORY OF BREAST CANCER: ICD-10-CM

## 2023-06-17 DIAGNOSIS — E78.5 HYPERLIPIDEMIA, UNSPECIFIED HYPERLIPIDEMIA TYPE: ICD-10-CM

## 2023-06-17 DIAGNOSIS — E11.9 TYPE 2 DIABETES MELLITUS WITHOUT COMPLICATION, WITHOUT LONG-TERM CURRENT USE OF INSULIN (HCC): ICD-10-CM

## 2023-06-17 LAB
ALBUMIN SERPL-MCNC: 3.9 G/DL (ref 3.4–5)
ALBUMIN/GLOB SERPL: 1.1 {RATIO} (ref 1–2)
ALP LIVER SERPL-CCNC: 109 U/L
ALT SERPL-CCNC: 54 U/L
ANION GAP SERPL CALC-SCNC: 5 MMOL/L (ref 0–18)
AST SERPL-CCNC: 30 U/L (ref 15–37)
BASOPHILS # BLD AUTO: 0.04 X10(3) UL (ref 0–0.2)
BASOPHILS NFR BLD AUTO: 0.7 %
BILIRUB SERPL-MCNC: 0.6 MG/DL (ref 0.1–2)
BUN BLD-MCNC: 16 MG/DL (ref 7–18)
CALCIUM BLD-MCNC: 9.2 MG/DL (ref 8.5–10.1)
CARTRIDGE LOT#: ABNORMAL NUMERIC
CHLORIDE SERPL-SCNC: 108 MMOL/L (ref 98–112)
CHOLEST SERPL-MCNC: 123 MG/DL (ref ?–200)
CO2 SERPL-SCNC: 27 MMOL/L (ref 21–32)
CREAT BLD-MCNC: 0.75 MG/DL
EOSINOPHIL # BLD AUTO: 0.16 X10(3) UL (ref 0–0.7)
EOSINOPHIL NFR BLD AUTO: 2.6 %
ERYTHROCYTE [DISTWIDTH] IN BLOOD BY AUTOMATED COUNT: 12.5 %
FASTING PATIENT LIPID ANSWER: YES
FASTING STATUS PATIENT QL REPORTED: YES
GFR SERPLBLD BASED ON 1.73 SQ M-ARVRAT: 93 ML/MIN/1.73M2 (ref 60–?)
GLOBULIN PLAS-MCNC: 3.6 G/DL (ref 2.8–4.4)
GLUCOSE BLD-MCNC: 117 MG/DL (ref 70–99)
HCT VFR BLD AUTO: 45.1 %
HDLC SERPL-MCNC: 38 MG/DL (ref 40–59)
HEMOGLOBIN A1C: 6.8 % (ref 4.3–5.6)
HGB BLD-MCNC: 14.6 G/DL
IMM GRANULOCYTES # BLD AUTO: 0.01 X10(3) UL (ref 0–1)
IMM GRANULOCYTES NFR BLD: 0.2 %
LDLC SERPL CALC-MCNC: 54 MG/DL (ref ?–100)
LYMPHOCYTES # BLD AUTO: 2.2 X10(3) UL (ref 1–4)
LYMPHOCYTES NFR BLD AUTO: 35.8 %
MCH RBC QN AUTO: 29.7 PG (ref 26–34)
MCHC RBC AUTO-ENTMCNC: 32.4 G/DL (ref 31–37)
MCV RBC AUTO: 91.9 FL
MONOCYTES # BLD AUTO: 0.23 X10(3) UL (ref 0.1–1)
MONOCYTES NFR BLD AUTO: 3.7 %
NEUTROPHILS # BLD AUTO: 3.5 X10 (3) UL (ref 1.5–7.7)
NEUTROPHILS # BLD AUTO: 3.5 X10(3) UL (ref 1.5–7.7)
NEUTROPHILS NFR BLD AUTO: 57 %
NONHDLC SERPL-MCNC: 85 MG/DL (ref ?–130)
OSMOLALITY SERPL CALC.SUM OF ELEC: 292 MOSM/KG (ref 275–295)
PLATELET # BLD AUTO: 203 10(3)UL (ref 150–450)
POTASSIUM SERPL-SCNC: 4.4 MMOL/L (ref 3.5–5.1)
PROT SERPL-MCNC: 7.5 G/DL (ref 6.4–8.2)
RBC # BLD AUTO: 4.91 X10(6)UL
SODIUM SERPL-SCNC: 140 MMOL/L (ref 136–145)
TRIGL SERPL-MCNC: 187 MG/DL (ref 30–149)
TSI SER-ACNC: 1.56 MIU/ML (ref 0.36–3.74)
VIT D+METAB SERPL-MCNC: 47.6 NG/ML (ref 30–100)
VLDLC SERPL CALC-MCNC: 27 MG/DL (ref 0–30)
WBC # BLD AUTO: 6.1 X10(3) UL (ref 4–11)

## 2023-06-17 PROCEDURE — 80053 COMPREHEN METABOLIC PANEL: CPT

## 2023-06-17 PROCEDURE — 82306 VITAMIN D 25 HYDROXY: CPT

## 2023-06-17 PROCEDURE — 80061 LIPID PANEL: CPT

## 2023-06-17 PROCEDURE — 84443 ASSAY THYROID STIM HORMONE: CPT

## 2023-06-17 PROCEDURE — 85025 COMPLETE CBC W/AUTO DIFF WBC: CPT

## 2023-06-17 PROCEDURE — 36415 COLL VENOUS BLD VENIPUNCTURE: CPT

## 2023-06-17 RX ORDER — GLIMEPIRIDE 1 MG/1
1 TABLET ORAL 2 TIMES DAILY
Qty: 180 TABLET | Refills: 4 | Status: SHIPPED | OUTPATIENT
Start: 2023-06-17

## 2023-06-17 RX ORDER — EMPAGLIFLOZIN 10 MG/1
10 TABLET, FILM COATED ORAL DAILY
Qty: 90 TABLET | Refills: 4 | Status: SHIPPED | OUTPATIENT
Start: 2023-06-17

## 2023-06-19 NOTE — PROGRESS NOTES
Labs are all stable. Continue current medications.  Try to increase exercise - cardio to lower your triglycerides. - Dr. Marium Perrin

## 2023-06-20 ENCOUNTER — TELEPHONE (OUTPATIENT)
Dept: FAMILY MEDICINE CLINIC | Facility: CLINIC | Age: 58
End: 2023-06-20

## 2023-06-26 NOTE — TELEPHONE ENCOUNTER
I verified name and  I  informed pt of message below      Labs are all stable. Continue current medications.  Try to increase exercise - cardio to lower your triglycerides. - Dr. Marichuy Weaver   Written by Zi Flores MD on 2023  9:00 AM CDT

## 2023-06-28 ENCOUNTER — TELEPHONE (OUTPATIENT)
Dept: FAMILY MEDICINE CLINIC | Facility: CLINIC | Age: 58
End: 2023-06-28

## 2023-06-28 NOTE — TELEPHONE ENCOUNTER
Multiple attempts made to inform of results below sending no response letter out     Labs are all stable. Continue current medications.  Try to increase exercise - cardio to lower your triglycerides. - Dr. Jonh Bear   Written by Belia De Jesus MD on 6/19/2023  9:00 AM CDT

## 2023-09-27 ENCOUNTER — TELEPHONE (OUTPATIENT)
Dept: FAMILY MEDICINE CLINIC | Facility: CLINIC | Age: 58
End: 2023-09-27

## 2023-09-27 DIAGNOSIS — Z85.3 HISTORY OF BREAST CANCER: ICD-10-CM

## 2023-09-27 DIAGNOSIS — Z90.11 HISTORY OF RIGHT MASTECTOMY: ICD-10-CM

## 2023-09-27 DIAGNOSIS — Z12.31 SCREENING MAMMOGRAM FOR BREAST CANCER: Primary | ICD-10-CM

## 2023-09-27 NOTE — TELEPHONE ENCOUNTER
Nikhil Job from central scheduling called needing bilateral mammogram for just the left side for the patient.

## 2023-12-02 ENCOUNTER — HOSPITAL ENCOUNTER (OUTPATIENT)
Dept: MAMMOGRAPHY | Age: 58
Discharge: HOME | End: 2023-12-02
Attending: FAMILY MEDICINE
Payer: COMMERCIAL

## 2023-12-02 DIAGNOSIS — Z85.3 HISTORY OF BREAST CANCER: ICD-10-CM

## 2023-12-02 DIAGNOSIS — Z90.11 HISTORY OF RIGHT MASTECTOMY: ICD-10-CM

## 2023-12-02 DIAGNOSIS — Z12.31 SCREENING MAMMOGRAM FOR BREAST CANCER: ICD-10-CM

## 2023-12-02 PROCEDURE — 77067 SCR MAMMO BI INCL CAD: CPT | Performed by: FAMILY MEDICINE

## 2023-12-02 PROCEDURE — 77063 BREAST TOMOSYNTHESIS BI: CPT | Performed by: FAMILY MEDICINE

## 2024-02-17 ENCOUNTER — OFFICE VISIT (OUTPATIENT)
Dept: FAMILY MEDICINE CLINIC | Facility: CLINIC | Age: 59
End: 2024-02-17

## 2024-02-17 ENCOUNTER — NURSE ONLY (OUTPATIENT)
Dept: INTERNAL MEDICINE CLINIC | Facility: CLINIC | Age: 59
End: 2024-02-17

## 2024-02-17 ENCOUNTER — LAB ENCOUNTER (OUTPATIENT)
Dept: LAB | Age: 59
End: 2024-02-17
Attending: FAMILY MEDICINE
Payer: COMMERCIAL

## 2024-02-17 VITALS
SYSTOLIC BLOOD PRESSURE: 131 MMHG | HEIGHT: 65 IN | BODY MASS INDEX: 26.79 KG/M2 | DIASTOLIC BLOOD PRESSURE: 74 MMHG | HEART RATE: 60 BPM | WEIGHT: 160.81 LBS

## 2024-02-17 DIAGNOSIS — E11.9 TYPE 2 DIABETES MELLITUS WITHOUT COMPLICATION, WITHOUT LONG-TERM CURRENT USE OF INSULIN (HCC): Primary | ICD-10-CM

## 2024-02-17 DIAGNOSIS — I10 ESSENTIAL HYPERTENSION: ICD-10-CM

## 2024-02-17 DIAGNOSIS — E11.9 TYPE 2 DIABETES MELLITUS WITHOUT COMPLICATION, WITHOUT LONG-TERM CURRENT USE OF INSULIN (HCC): ICD-10-CM

## 2024-02-17 DIAGNOSIS — E78.5 HYPERLIPIDEMIA, UNSPECIFIED HYPERLIPIDEMIA TYPE: ICD-10-CM

## 2024-02-17 LAB
ALBUMIN SERPL-MCNC: 4.4 G/DL (ref 3.2–4.8)
ALBUMIN/GLOB SERPL: 1.4 {RATIO} (ref 1–2)
ALP LIVER SERPL-CCNC: 93 U/L
ALT SERPL-CCNC: 28 U/L
ANION GAP SERPL CALC-SCNC: 6 MMOL/L (ref 0–18)
AST SERPL-CCNC: 18 U/L (ref ?–34)
BASOPHILS # BLD AUTO: 0.04 X10(3) UL (ref 0–0.2)
BASOPHILS NFR BLD AUTO: 0.6 %
BILIRUB SERPL-MCNC: 0.7 MG/DL (ref 0.3–1.2)
BUN BLD-MCNC: 8 MG/DL (ref 9–23)
BUN/CREAT SERPL: 10.4 (ref 10–20)
CALCIUM BLD-MCNC: 9.7 MG/DL (ref 8.7–10.4)
CARTRIDGE LOT#: ABNORMAL NUMERIC
CHLORIDE SERPL-SCNC: 108 MMOL/L (ref 98–112)
CHOLEST SERPL-MCNC: 105 MG/DL (ref ?–200)
CO2 SERPL-SCNC: 29 MMOL/L (ref 21–32)
CREAT BLD-MCNC: 0.77 MG/DL
CREAT UR-SCNC: 148.5 MG/DL
DEPRECATED RDW RBC AUTO: 40.1 FL (ref 35.1–46.3)
EGFRCR SERPLBLD CKD-EPI 2021: 90 ML/MIN/1.73M2 (ref 60–?)
EOSINOPHIL # BLD AUTO: 0.09 X10(3) UL (ref 0–0.7)
EOSINOPHIL NFR BLD AUTO: 1.3 %
ERYTHROCYTE [DISTWIDTH] IN BLOOD BY AUTOMATED COUNT: 12.1 % (ref 11–15)
FASTING PATIENT LIPID ANSWER: YES
FASTING STATUS PATIENT QL REPORTED: YES
GLOBULIN PLAS-MCNC: 3.1 G/DL (ref 2.8–4.4)
GLUCOSE BLD-MCNC: 119 MG/DL (ref 70–99)
HCT VFR BLD AUTO: 43.3 %
HDLC SERPL-MCNC: 38 MG/DL (ref 40–59)
HEMOGLOBIN A1C: 7 % (ref 4.3–5.6)
HGB BLD-MCNC: 14.6 G/DL
IMM GRANULOCYTES # BLD AUTO: 0.01 X10(3) UL (ref 0–1)
IMM GRANULOCYTES NFR BLD: 0.1 %
LDLC SERPL CALC-MCNC: 44 MG/DL (ref ?–100)
LYMPHOCYTES # BLD AUTO: 2.4 X10(3) UL (ref 1–4)
LYMPHOCYTES NFR BLD AUTO: 35.5 %
MCH RBC QN AUTO: 30.6 PG (ref 26–34)
MCHC RBC AUTO-ENTMCNC: 33.7 G/DL (ref 31–37)
MCV RBC AUTO: 90.8 FL
MICROALBUMIN UR-MCNC: 0.5 MG/DL
MICROALBUMIN/CREAT 24H UR-RTO: 3.4 UG/MG (ref ?–30)
MONOCYTES # BLD AUTO: 0.32 X10(3) UL (ref 0.1–1)
MONOCYTES NFR BLD AUTO: 4.7 %
NEUTROPHILS # BLD AUTO: 3.91 X10 (3) UL (ref 1.5–7.7)
NEUTROPHILS # BLD AUTO: 3.91 X10(3) UL (ref 1.5–7.7)
NEUTROPHILS NFR BLD AUTO: 57.8 %
NONHDLC SERPL-MCNC: 67 MG/DL (ref ?–130)
OSMOLALITY SERPL CALC.SUM OF ELEC: 295 MOSM/KG (ref 275–295)
PLATELET # BLD AUTO: 204 10(3)UL (ref 150–450)
POTASSIUM SERPL-SCNC: 4.4 MMOL/L (ref 3.5–5.1)
PROT SERPL-MCNC: 7.5 G/DL (ref 5.7–8.2)
RBC # BLD AUTO: 4.77 X10(6)UL
SODIUM SERPL-SCNC: 143 MMOL/L (ref 136–145)
TRIGL SERPL-MCNC: 131 MG/DL (ref 30–149)
TSI SER-ACNC: 0.89 MIU/ML (ref 0.55–4.78)
VLDLC SERPL CALC-MCNC: 18 MG/DL (ref 0–30)
WBC # BLD AUTO: 6.8 X10(3) UL (ref 4–11)

## 2024-02-17 PROCEDURE — 80061 LIPID PANEL: CPT

## 2024-02-17 PROCEDURE — 99214 OFFICE O/P EST MOD 30 MIN: CPT | Performed by: FAMILY MEDICINE

## 2024-02-17 PROCEDURE — 80053 COMPREHEN METABOLIC PANEL: CPT

## 2024-02-17 PROCEDURE — 90677 PCV20 VACCINE IM: CPT | Performed by: FAMILY MEDICINE

## 2024-02-17 PROCEDURE — 82570 ASSAY OF URINE CREATININE: CPT

## 2024-02-17 PROCEDURE — 83036 HEMOGLOBIN GLYCOSYLATED A1C: CPT | Performed by: FAMILY MEDICINE

## 2024-02-17 PROCEDURE — 92229 IMG RTA DETC/MNTR DS POC ALY: CPT | Performed by: FAMILY MEDICINE

## 2024-02-17 PROCEDURE — 85025 COMPLETE CBC W/AUTO DIFF WBC: CPT

## 2024-02-17 PROCEDURE — 36415 COLL VENOUS BLD VENIPUNCTURE: CPT

## 2024-02-17 PROCEDURE — 90471 IMMUNIZATION ADMIN: CPT | Performed by: FAMILY MEDICINE

## 2024-02-17 PROCEDURE — 82043 UR ALBUMIN QUANTITATIVE: CPT

## 2024-02-17 PROCEDURE — 84443 ASSAY THYROID STIM HORMONE: CPT

## 2024-02-17 RX ORDER — ATORVASTATIN CALCIUM 20 MG/1
20 TABLET, FILM COATED ORAL DAILY
Qty: 90 TABLET | Refills: 3 | Status: SHIPPED | OUTPATIENT
Start: 2024-02-17

## 2024-02-17 RX ORDER — LISINOPRIL 40 MG/1
40 TABLET ORAL DAILY
Qty: 90 TABLET | Refills: 3 | Status: SHIPPED | OUTPATIENT
Start: 2024-02-17

## 2024-02-17 RX ORDER — ERGOCALCIFEROL 1.25 MG/1
50000 CAPSULE ORAL
Qty: 12 CAPSULE | Refills: 4 | Status: SHIPPED | OUTPATIENT
Start: 2024-02-17

## 2024-02-17 RX ORDER — GLIMEPIRIDE 1 MG/1
1 TABLET ORAL 2 TIMES DAILY
Qty: 180 TABLET | Refills: 4 | Status: SHIPPED | OUTPATIENT
Start: 2024-02-17

## 2024-02-17 RX ORDER — EMPAGLIFLOZIN 10 MG/1
10 TABLET, FILM COATED ORAL DAILY
Qty: 90 TABLET | Refills: 4 | Status: SHIPPED | OUTPATIENT
Start: 2024-02-17

## 2024-02-17 RX ORDER — LEVOTHYROXINE SODIUM 0.05 MG/1
50 TABLET ORAL DAILY
Qty: 90 TABLET | Refills: 3 | Status: SHIPPED | OUTPATIENT
Start: 2024-02-17

## 2024-02-17 NOTE — PROGRESS NOTES
Alena Dumont is a 58 year old female.   Chief Complaint   Patient presents with    Diabetes     6 months     HPI:   Here for diabetes check up. Reports checking her glucose 120-140 most of the time. Feeling ok otherwise.    Current Outpatient Medications on File Prior to Visit   Medication Sig Dispense Refill    JARDIANCE 10 MG Oral Tab Take 1 tablet (10 mg total) by mouth daily. 90 tablet 4    glimepiride 1 MG Oral Tab Take 1 tablet (1 mg total) by mouth 2 (two) times daily. 180 tablet 4    levothyroxine 50 MCG Oral Tab Take 1 tablet (50 mcg total) by mouth daily. 90 tablet 3    metFORMIN HCl 1000 MG Oral Tab Take 1 tablet (1,000 mg total) by mouth 2 (two) times daily. 180 tablet 3    lisinopril 40 MG Oral Tab Take 1 tablet (40 mg total) by mouth daily. 90 tablet 3    atorvastatin 20 MG Oral Tab Take 1 tablet (20 mg total) by mouth daily. 90 tablet 3    ergocalciferol 1.25 MG (51727 UT) Oral Cap Take 1 capsule (50,000 Units total) by mouth every 14 (fourteen) days. 12 capsule 4    Diclofenac Sodium 1 % External Gel Apply 2 g topically 4 (four) times daily. 150 g 1    Diclofenac Sodium 1 % Transdermal Gel Apply 2 g topically 4 (four) times daily. 1 Tube 0     No current facility-administered medications on file prior to visit.      Past Medical History:   Diagnosis Date    Breast CA (HCC)     Cancer (HCC)     Diabetes (HCC)     Essential hypertension     Hyperlipidemia     Thyroid disease       Social History:  Social History     Socioeconomic History    Marital status:    Tobacco Use    Smoking status: Never    Smokeless tobacco: Never   Vaping Use    Vaping Use: Never used   Substance and Sexual Activity    Alcohol use: Yes     Comment: rarely    Drug use: Never        REVIEW OF SYSTEMS:   GENERAL HEALTH: feels well otherwise  SKIN: denies any unusual skin lesions or rashes  HEENT: denies eye complaints,denies sore throat, denies ear pain  RESPIRATORY: denies shortness of breath, denies  cough  CARDIOVASCULAR: denies chest pain  GI: denies abdominal pain and denies heartburn  NEURO: denies headaches  Musculoskeletal: pos joint pain, back pain    EXAM:   /74   Pulse 60   Ht 5' 5\" (1.651 m)   Wt 160 lb 12.8 oz (72.9 kg)   BMI 26.76 kg/m²   GENERAL: well developed, well nourished,in no apparent distress    LUNGS: clear to auscultation  CARDIO: RRR without murmur  EXTREMITIES: no cyanosis, clubbing or edema  Bilateral barefoot skin diabetic exam is normal, visualized feet and the appearance is normal.  Bilateral monofilament/sensation of both feet is normal.  Pulsation pedal pulse exam of both lower legs/feet is normal as well.       ASSESSMENT AND PLAN:   1. Type 2 diabetes mellitus without complication, without long-term current use of insulin (HCC)  Stable   - POC Glycohemoglobin [81407]  - Comp Metabolic Panel (14); Future  - CBC With Differential With Platelet; Future  - Microalb/Creat Ratio, Random Urine; Future  - Lipid Panel; Future  - TSH W Reflex To Free T4; Future  - Diabetic Retinopathy Exam  OU - Both Eyes; Future  - Ophthalmology Referral - In Network    2. Essential hypertension  BP stable     3. Hyperlipidemia, unspecified hyperlipidemia type          The patient indicates understanding of these issues and agrees to the plan.      Jordyn Brand MD  2/17/2024  10:09 AM

## 2024-06-10 ENCOUNTER — TELEPHONE (OUTPATIENT)
Dept: FAMILY MEDICINE CLINIC | Facility: CLINIC | Age: 59
End: 2024-06-10

## 2024-06-10 NOTE — TELEPHONE ENCOUNTER
Received report of patient's most recent eye exam dated on 6-3-24 with Cameron Mansfield at [facility]. Eye exam was documented in patient's 'Diabetes Flowsheet' and placed in providers folder for review.

## 2024-08-17 ENCOUNTER — LAB ENCOUNTER (OUTPATIENT)
Dept: LAB | Age: 59
End: 2024-08-17
Attending: FAMILY MEDICINE
Payer: COMMERCIAL

## 2024-08-17 ENCOUNTER — OFFICE VISIT (OUTPATIENT)
Dept: FAMILY MEDICINE CLINIC | Facility: CLINIC | Age: 59
End: 2024-08-17

## 2024-08-17 VITALS
HEIGHT: 65 IN | WEIGHT: 160.38 LBS | SYSTOLIC BLOOD PRESSURE: 103 MMHG | HEART RATE: 63 BPM | BODY MASS INDEX: 26.72 KG/M2 | DIASTOLIC BLOOD PRESSURE: 65 MMHG

## 2024-08-17 DIAGNOSIS — Z00.00 ROUTINE MEDICAL EXAM: ICD-10-CM

## 2024-08-17 DIAGNOSIS — G89.29 CHRONIC HEEL PAIN, LEFT: ICD-10-CM

## 2024-08-17 DIAGNOSIS — E11.9 TYPE 2 DIABETES MELLITUS WITHOUT COMPLICATION, WITHOUT LONG-TERM CURRENT USE OF INSULIN (HCC): Primary | ICD-10-CM

## 2024-08-17 DIAGNOSIS — I10 ESSENTIAL HYPERTENSION: ICD-10-CM

## 2024-08-17 DIAGNOSIS — E78.5 HYPERLIPIDEMIA, UNSPECIFIED HYPERLIPIDEMIA TYPE: ICD-10-CM

## 2024-08-17 DIAGNOSIS — M79.672 CHRONIC HEEL PAIN, LEFT: ICD-10-CM

## 2024-08-17 DIAGNOSIS — Z12.31 VISIT FOR SCREENING MAMMOGRAM: ICD-10-CM

## 2024-08-17 LAB
ALBUMIN SERPL-MCNC: 4.4 G/DL (ref 3.2–4.8)
ALBUMIN/GLOB SERPL: 1.6 {RATIO} (ref 1–2)
ALP LIVER SERPL-CCNC: 100 U/L
ALT SERPL-CCNC: 41 U/L
ANION GAP SERPL CALC-SCNC: 7 MMOL/L (ref 0–18)
AST SERPL-CCNC: 23 U/L (ref ?–34)
BASOPHILS # BLD AUTO: 0.04 X10(3) UL (ref 0–0.2)
BASOPHILS NFR BLD AUTO: 0.6 %
BILIRUB SERPL-MCNC: 0.6 MG/DL (ref 0.3–1.2)
BUN BLD-MCNC: 14 MG/DL (ref 9–23)
BUN/CREAT SERPL: 17.3 (ref 10–20)
CALCIUM BLD-MCNC: 9.4 MG/DL (ref 8.7–10.4)
CHLORIDE SERPL-SCNC: 109 MMOL/L (ref 98–112)
CHOLEST SERPL-MCNC: 133 MG/DL (ref ?–200)
CO2 SERPL-SCNC: 28 MMOL/L (ref 21–32)
CREAT BLD-MCNC: 0.81 MG/DL
DEPRECATED RDW RBC AUTO: 40.3 FL (ref 35.1–46.3)
EGFRCR SERPLBLD CKD-EPI 2021: 84 ML/MIN/1.73M2 (ref 60–?)
EOSINOPHIL # BLD AUTO: 0.07 X10(3) UL (ref 0–0.7)
EOSINOPHIL NFR BLD AUTO: 1.1 %
ERYTHROCYTE [DISTWIDTH] IN BLOOD BY AUTOMATED COUNT: 12.2 % (ref 11–15)
FASTING PATIENT LIPID ANSWER: YES
FASTING STATUS PATIENT QL REPORTED: YES
GLOBULIN PLAS-MCNC: 2.8 G/DL (ref 2–3.5)
GLUCOSE BLD-MCNC: 123 MG/DL (ref 70–99)
HCT VFR BLD AUTO: 43.2 %
HDLC SERPL-MCNC: 39 MG/DL (ref 40–59)
HEMOGLOBIN A1C: 7.1 % (ref 4.3–5.6)
HGB BLD-MCNC: 14.5 G/DL
IMM GRANULOCYTES # BLD AUTO: 0.01 X10(3) UL (ref 0–1)
IMM GRANULOCYTES NFR BLD: 0.2 %
LDLC SERPL CALC-MCNC: 64 MG/DL (ref ?–100)
LYMPHOCYTES # BLD AUTO: 2.33 X10(3) UL (ref 1–4)
LYMPHOCYTES NFR BLD AUTO: 35.8 %
MCH RBC QN AUTO: 30.4 PG (ref 26–34)
MCHC RBC AUTO-ENTMCNC: 33.6 G/DL (ref 31–37)
MCV RBC AUTO: 90.6 FL
MONOCYTES # BLD AUTO: 0.34 X10(3) UL (ref 0.1–1)
MONOCYTES NFR BLD AUTO: 5.2 %
NEUTROPHILS # BLD AUTO: 3.72 X10 (3) UL (ref 1.5–7.7)
NEUTROPHILS # BLD AUTO: 3.72 X10(3) UL (ref 1.5–7.7)
NEUTROPHILS NFR BLD AUTO: 57.1 %
NONHDLC SERPL-MCNC: 94 MG/DL (ref ?–130)
OSMOLALITY SERPL CALC.SUM OF ELEC: 300 MOSM/KG (ref 275–295)
PLATELET # BLD AUTO: 217 10(3)UL (ref 150–450)
POTASSIUM SERPL-SCNC: 4.5 MMOL/L (ref 3.5–5.1)
PROT SERPL-MCNC: 7.2 G/DL (ref 5.7–8.2)
RBC # BLD AUTO: 4.77 X10(6)UL
SODIUM SERPL-SCNC: 144 MMOL/L (ref 136–145)
TRIGL SERPL-MCNC: 176 MG/DL (ref 30–149)
TSI SER-ACNC: 0.72 MIU/ML (ref 0.55–4.78)
VIT B12 SERPL-MCNC: 731 PG/ML (ref 211–911)
VLDLC SERPL CALC-MCNC: 26 MG/DL (ref 0–30)
WBC # BLD AUTO: 6.5 X10(3) UL (ref 4–11)

## 2024-08-17 PROCEDURE — 83036 HEMOGLOBIN GLYCOSYLATED A1C: CPT | Performed by: FAMILY MEDICINE

## 2024-08-17 PROCEDURE — 99396 PREV VISIT EST AGE 40-64: CPT | Performed by: FAMILY MEDICINE

## 2024-08-17 PROCEDURE — 80053 COMPREHEN METABOLIC PANEL: CPT

## 2024-08-17 PROCEDURE — 85025 COMPLETE CBC W/AUTO DIFF WBC: CPT

## 2024-08-17 PROCEDURE — 84443 ASSAY THYROID STIM HORMONE: CPT

## 2024-08-17 PROCEDURE — 80061 LIPID PANEL: CPT

## 2024-08-17 PROCEDURE — 82607 VITAMIN B-12: CPT

## 2024-08-17 PROCEDURE — 36415 COLL VENOUS BLD VENIPUNCTURE: CPT

## 2024-08-17 RX ORDER — CELECOXIB 200 MG/1
200 CAPSULE ORAL DAILY
Qty: 30 CAPSULE | Refills: 0 | Status: SHIPPED | OUTPATIENT
Start: 2024-08-17 | End: 2025-08-12

## 2024-08-17 NOTE — PROGRESS NOTES
HPI:   Alena Dumont is a 59 year old female who presents for a complete physical exam.    Reports sometimes cheating with food. Reports glucose 142 in morning. Trying to stay active. Reports problems with heel.     Wt Readings from Last 3 Encounters:   08/17/24 160 lb 6.4 oz (72.8 kg)   02/17/24 160 lb 12.8 oz (72.9 kg)   06/17/23 159 lb (72.1 kg)     Body mass index is 26.69 kg/m².       Current Outpatient Medications   Medication Sig Dispense Refill    atorvastatin 20 MG Oral Tab Take 1 tablet (20 mg total) by mouth daily. 90 tablet 3    ergocalciferol 1.25 MG (09776 UT) Oral Cap Take 1 capsule (50,000 Units total) by mouth every 14 (fourteen) days. 12 capsule 4    glimepiride 1 MG Oral Tab Take 1 tablet (1 mg total) by mouth 2 (two) times daily. 180 tablet 4    JARDIANCE 10 MG Oral Tab Take 1 tablet (10 mg total) by mouth daily. 90 tablet 4    levothyroxine 50 MCG Oral Tab Take 1 tablet (50 mcg total) by mouth daily. 90 tablet 3    lisinopril 40 MG Oral Tab Take 1 tablet (40 mg total) by mouth daily. 90 tablet 3    metFORMIN HCl 1000 MG Oral Tab Take 1 tablet (1,000 mg total) by mouth 2 (two) times daily. 180 tablet 3    diclofenac 1 % External Gel Apply 2 g topically 4 (four) times daily. 150 g 4    Diclofenac Sodium 1 % Transdermal Gel Apply 2 g topically 4 (four) times daily. 1 Tube 0      Past Medical History:    Breast CA (HCC)    Cancer (HCC)    Diabetes (HCC)    Essential hypertension    Hyperlipidemia    Thyroid disease      Past Surgical History:   Procedure Laterality Date    Cholecystectomy      Cyst aspiration right  10/22/2005    us FNA    Mastectomy right  2002    Needle biopsy left  05/21/2008    us guided    Reduction left  2002    lift      Family History   Problem Relation Age of Onset    Pancreatic Cancer Mother     Hypertension Mother     Lipids Mother     Cancer Mother 81        pancreatic ca    Diabetes Sister     Diabetes Brother     Breast Cancer Self 37      Social History:   Social  History     Socioeconomic History    Marital status:    Tobacco Use    Smoking status: Never    Smokeless tobacco: Never   Vaping Use    Vaping status: Never Used   Substance and Sexual Activity    Alcohol use: Yes     Comment: rarely    Drug use: Never          REVIEW OF SYSTEMS:   GENERAL: feels well otherwise  Review of Systems   EXAM:   /65   Pulse 63   Ht 5' 5\" (1.651 m)   Wt 160 lb 6.4 oz (72.8 kg)   BMI 26.69 kg/m²     GENERAL: well developed, well nourished,in no apparent distress  SKIN: no rashes,no suspicious lesions  HEENT: atraumatic, normocephalic,ears and throat are clear  EYES:PERRLA, EOMI, conjunctiva are clear  NECK: supple,no adenopathy,no bruits  BREAST: no dominant or suspicious mass, nontender  LUNGS: clear to auscultation  CARDIO: RRR without murmur  GI: good BS's,no masses, HSM or tenderness  EXTREMITIES: no cyanosis, clubbing or edema  NEURO: Oriented times three,cranial nerves are intact,motor and sensory are grossly intact    ASSESSMENT AND PLAN:   Alena Dumont is a 59 year old female who presents for a complete physical exam.      1. Type 2 diabetes mellitus without complication, without long-term current use of insulin (HCC)  Stable - slight worsening. Discussed cutting out sweats   - POC Glycohemoglobin [51133]    2. Essential hypertension  BP stable     3. Hyperlipidemia, unspecified hyperlipidemia type  Due for labs.     4. Routine medical exam    - Comp Metabolic Panel (14); Future  - Lipid Panel; Future  - TSH W Reflex To Free T4; Future  - Vitamin B12; Future  - CBC With Differential With Platelet; Future    5. Visit for screening mammogram    - [facility] JIMMIE 2D+3D SCREENING BILAT (CPT=77067/39433); Future    6. Chronic heel pain, left  Celebrex and Voltaren gel and exercises    - Podiatry Referral - In Network       Jordyn Brand MD  8/17/2024  9:06 AM

## 2024-08-27 ENCOUNTER — TELEPHONE (OUTPATIENT)
Dept: FAMILY MEDICINE CLINIC | Facility: CLINIC | Age: 59
End: 2024-08-27

## 2024-08-27 NOTE — TELEPHONE ENCOUNTER
With [employer]  Amy   ID # [id number]  Patient calling ( name and date of birth of patient verified ) states she received a message to call our office     Relayed  result note message     Patient Communication     Edit Comments   Add Notifications  Back to Top    Tests are all stable. No changes to medications. - Dr. Brand   Written by Jordyn Brand MD on 8/26/2024 10:28 AM CDT

## 2024-09-19 RX ORDER — GLIMEPIRIDE 1 MG/1
1 TABLET ORAL 2 TIMES DAILY
Qty: 180 TABLET | Refills: 4 | OUTPATIENT
Start: 2024-09-19

## 2024-09-24 RX ORDER — CELECOXIB 200 MG/1
200 CAPSULE ORAL DAILY
Qty: 30 CAPSULE | Refills: 0 | Status: SHIPPED | OUTPATIENT
Start: 2024-09-24

## 2024-09-24 NOTE — TELEPHONE ENCOUNTER
Dr. Brand Please advise medication was written for a quantity of 30 with 0 refills for chronic heel pain, left. Is refill appropriate?      Disp Refills Start End    celecoxib (CELEBREX) 200 MG Oral Cap 30 capsule 0 8/17/2024 8/12/2025    Sig - Route: Take 1 capsule (200 mg total) by mouth daily. - Oral    Sent to pharmacy as: Celecoxib 200 MG Oral Capsule (CeleBREX)    E-Prescribing Status: Receipt confirmed by pharmacy (8/17/2024  9:11 AM CDT)      Pharmacy    [id number] - [facility] [address] AT [address], [phone], [phone]     Celecoxib 200 mg Oral Daily    6. Chronic heel pain, left  Celebrex and Voltaren gel and exercises    - Podiatry Referral - In Network

## 2024-10-02 ENCOUNTER — TELEPHONE (OUTPATIENT)
Dept: FAMILY MEDICINE CLINIC | Facility: CLINIC | Age: 59
End: 2024-10-02

## 2024-10-02 DIAGNOSIS — Z85.3 HISTORY OF BREAST CANCER: ICD-10-CM

## 2024-10-02 DIAGNOSIS — Z12.31 SCREENING MAMMOGRAM FOR BREAST CANCER: Primary | ICD-10-CM

## 2024-10-02 DIAGNOSIS — Z90.11 HISTORY OF RIGHT MASTECTOMY: ICD-10-CM

## 2024-10-02 NOTE — TELEPHONE ENCOUNTER
Screening mammogram order already placed in August  RECOMMENDATIONS:   ROUTINE MAMMOGRAM AND CLINICAL EVALUATION IN 12 MONTHS.

## 2024-10-02 NOTE — TELEPHONE ENCOUNTER
Patient said she needs a new order for her mammogram. Patient needs order for left screening. Patient has ad this order the last 3 years. Patient is also requesting order is coded for prevention so her insurance can cover order. Please advise

## 2024-10-03 NOTE — TELEPHONE ENCOUNTER
Patient calling to state mammogram needs to be left side only, as only has left breast, was told order needed to be updated as was placed as bilateral, also wants to make sure it is coded as preventive. Asking for call once placed.

## 2024-10-11 RX ORDER — EMPAGLIFLOZIN 10 MG/1
10 TABLET, FILM COATED ORAL DAILY
Qty: 90 TABLET | Refills: 3 | Status: SHIPPED | OUTPATIENT
Start: 2024-10-11

## 2024-10-11 NOTE — TELEPHONE ENCOUNTER
Refill passed per [facility] protocol.  Requested Prescriptions   Pending Prescriptions Disp Refills    JARDIANCE 10 MG Oral Tab [Pharmacy Med Name: JARDIANCE 10MG TABLETS] 90 tablet 4     Sig: TAKE 1 TABLET(10 MG) BY MOUTH DAILY       Diabetes Medication Protocol Passed - 10/11/2024  2:31 PM        Passed - Last A1C < 7.5 and within past 6 months     Lab Results   Component Value Date    A1C 7.1 (A) 08/17/2024             Passed - In person appointment or virtual visit in the past 6 mos or appointment in next 3 mos     Recent Outpatient Visits              1 month ago Type 2 diabetes mellitus without complication, without long-term current use of insulin ([facility]    Office Visit    7 months ago Type 2 diabetes mellitus without complication, without long-term current use of insulin ([facility])    [facility]    Nurse Only    7 months ago Type 2 diabetes mellitus without complication, without long-term current use of insulin ([facility]    Office Visit    1 year ago Type 2 diabetes mellitus without complication, without long-term current use of insulin ([facility])    [facility]    Office Visit    1 year ago Type 2 diabetes mellitus without complication, without long-term current use of insulin ([facility])    [facility]    [facility] Visit          Future Appointments         Provider Department Appt Notes    In 1 month ADO DEXA RM1; ADO DELONTE RM1 [facility] [place]     In 4 months Jordyn Brand MD [facility] Return for Follow up in 6 months for Diabetes check.                    Passed - Microalbumin procedure in past 12 months or taking ACE/ARB        Passed - EGFRCR  or GFRNAA > 50     GFR Evaluation  EGFRCR: 84 , resulted on 8/17/2024          Passed - GFR in the past 12 months           Future Appointments         Provider Department Appt Notes    In 1 month ELISHA ANAYA RM1; ELISHA MARTEL RM1 [facility]     In 4 months Jordyn Brand MD [facility] Return for Follow up in 6 months for Diabetes check.          Recent Outpatient Visits              1 month ago Type 2 diabetes mellitus without complication, without long-term current use of insulin [facility]    Office Visit    7 months ago Type 2 diabetes mellitus without complication, without long-term current use of insulin ([facility])    [facility]    Nurse Only    7 months ago Type 2 diabetes mellitus without complication, without long-term current use of insulin ([facility]    Office Visit    1 year ago Type 2 diabetes mellitus without complication, without long-term current use of insulin ([facility]    Office Visit    1 year ago Type 2 diabetes mellitus without complication, without long-term current use of insulin ([facility]    Office Visit

## 2024-10-23 RX ORDER — CELECOXIB 200 MG/1
200 CAPSULE ORAL DAILY
Qty: 30 CAPSULE | Refills: 1 | Status: SHIPPED | OUTPATIENT
Start: 2024-10-23

## 2024-10-23 NOTE — TELEPHONE ENCOUNTER
Refill passed per [facility] protocols.    Requested Prescriptions   Pending Prescriptions Disp Refills    CELECOXIB 200 MG Oral Cap [Pharmacy Med Name: CELECOXIB 200MG CAPSULES] 30 capsule 1     Sig: TAKE 1 CAPSULE(200 MG) BY MOUTH DAILY       Non-Narcotic Pain Medication Protocol Passed - 10/23/2024 11:03 AM        Passed - In person appointment or virtual visit in the past 6 mos or appointment in next 3 mos     Recent Outpatient Visits              2 months ago Type 2 diabetes mellitus without complication, without long-term current use of insulin ([facility]    Office Visit    8 months ago Type 2 diabetes mellitus without complication, without long-term current use of insulin ([facility])    [facility]    Nurse Only    8 months ago Type 2 diabetes mellitus without complication, without long-term current use of insulin ([facility]    Office Visit    1 year ago Type 2 diabetes mellitus without complication, without long-term current use of insulin ([facility]    Office Visit    1 year ago Type 2 diabetes mellitus without complication, without long-term current use of insulin ([facility])    [facility]    [facility] Visit          Future Appointments         Provider Department Appt Notes    In 1 month ELISHA ANAYA RM1; ELISHA MARTEL RM1 [facility]     In 4 months Jordyn Brand MD [facility] Return for Follow up in 6 months for Diabetes check.

## 2024-12-07 ENCOUNTER — HOSPITAL ENCOUNTER (OUTPATIENT)
Dept: MAMMOGRAPHY | Age: 59
Discharge: HOME | End: 2024-12-07
Attending: FAMILY MEDICINE
Payer: COMMERCIAL

## 2024-12-07 DIAGNOSIS — Z12.31 SCREENING MAMMOGRAM FOR BREAST CANCER: ICD-10-CM

## 2024-12-07 DIAGNOSIS — Z90.11 HISTORY OF RIGHT MASTECTOMY: ICD-10-CM

## 2024-12-07 DIAGNOSIS — Z85.3 HISTORY OF BREAST CANCER: ICD-10-CM

## 2024-12-07 PROCEDURE — 77063 BREAST TOMOSYNTHESIS BI: CPT | Performed by: FAMILY MEDICINE

## 2024-12-07 PROCEDURE — 77067 SCR MAMMO BI INCL CAD: CPT | Performed by: FAMILY MEDICINE

## 2024-12-13 RX ORDER — CELECOXIB 200 MG/1
200 CAPSULE ORAL DAILY
Qty: 90 CAPSULE | Refills: 0 | OUTPATIENT
Start: 2024-12-13

## 2025-03-01 ENCOUNTER — OFFICE VISIT (OUTPATIENT)
Dept: FAMILY MEDICINE CLINIC | Facility: CLINIC | Age: 60
End: 2025-03-01
Payer: COMMERCIAL

## 2025-03-01 ENCOUNTER — LAB ENCOUNTER (OUTPATIENT)
Dept: LAB | Age: 60
End: 2025-03-01
Attending: FAMILY MEDICINE
Payer: COMMERCIAL

## 2025-03-01 VITALS
DIASTOLIC BLOOD PRESSURE: 70 MMHG | WEIGHT: 166.38 LBS | HEIGHT: 65 IN | HEART RATE: 56 BPM | BODY MASS INDEX: 27.72 KG/M2 | SYSTOLIC BLOOD PRESSURE: 119 MMHG

## 2025-03-01 DIAGNOSIS — Z00.00 ROUTINE MEDICAL EXAM: ICD-10-CM

## 2025-03-01 DIAGNOSIS — E55.9 VITAMIN D DEFICIENCY: ICD-10-CM

## 2025-03-01 DIAGNOSIS — E11.9 TYPE 2 DIABETES MELLITUS WITHOUT COMPLICATION, WITHOUT LONG-TERM CURRENT USE OF INSULIN (HCC): Primary | ICD-10-CM

## 2025-03-01 DIAGNOSIS — E78.5 HYPERLIPIDEMIA, UNSPECIFIED HYPERLIPIDEMIA TYPE: ICD-10-CM

## 2025-03-01 DIAGNOSIS — E11.9 TYPE 2 DIABETES MELLITUS WITHOUT COMPLICATION, WITHOUT LONG-TERM CURRENT USE OF INSULIN (HCC): ICD-10-CM

## 2025-03-01 DIAGNOSIS — I10 ESSENTIAL HYPERTENSION: ICD-10-CM

## 2025-03-01 LAB
ALBUMIN SERPL-MCNC: 4.7 G/DL (ref 3.2–4.8)
ALBUMIN/GLOB SERPL: 1.9 {RATIO} (ref 1–2)
ALP LIVER SERPL-CCNC: 75 U/L
ALT SERPL-CCNC: 34 U/L
ANION GAP SERPL CALC-SCNC: 10 MMOL/L (ref 0–18)
AST SERPL-CCNC: 15 U/L (ref ?–34)
BASOPHILS # BLD AUTO: 0.03 X10(3) UL (ref 0–0.2)
BASOPHILS NFR BLD AUTO: 0.3 %
BILIRUB SERPL-MCNC: 0.8 MG/DL (ref 0.3–1.2)
BUN BLD-MCNC: 16 MG/DL (ref 9–23)
BUN/CREAT SERPL: 19.8 (ref 10–20)
CALCIUM BLD-MCNC: 9.4 MG/DL (ref 8.7–10.4)
CHLORIDE SERPL-SCNC: 101 MMOL/L (ref 98–112)
CHOLEST SERPL-MCNC: 205 MG/DL (ref ?–200)
CO2 SERPL-SCNC: 28 MMOL/L (ref 21–32)
CREAT BLD-MCNC: 0.81 MG/DL
CREAT UR-SCNC: 70.3 MG/DL
DEPRECATED RDW RBC AUTO: 41.7 FL (ref 35.1–46.3)
EGFRCR SERPLBLD CKD-EPI 2021: 84 ML/MIN/1.73M2 (ref 60–?)
EOSINOPHIL # BLD AUTO: 0.11 X10(3) UL (ref 0–0.7)
EOSINOPHIL NFR BLD AUTO: 1.1 %
ERYTHROCYTE [DISTWIDTH] IN BLOOD BY AUTOMATED COUNT: 12.4 % (ref 11–15)
FASTING PATIENT LIPID ANSWER: YES
FASTING STATUS PATIENT QL REPORTED: YES
GLOBULIN PLAS-MCNC: 2.5 G/DL (ref 2–3.5)
GLUCOSE BLD-MCNC: 130 MG/DL (ref 70–99)
HCT VFR BLD AUTO: 43.1 %
HDLC SERPL-MCNC: 49 MG/DL (ref 40–59)
HEMOGLOBIN A1C: 7.9 % (ref 4.3–5.6)
HGB BLD-MCNC: 14.3 G/DL
IMM GRANULOCYTES # BLD AUTO: 0.03 X10(3) UL (ref 0–1)
IMM GRANULOCYTES NFR BLD: 0.3 %
LDLC SERPL CALC-MCNC: 100 MG/DL (ref ?–100)
LYMPHOCYTES # BLD AUTO: 3.69 X10(3) UL (ref 1–4)
LYMPHOCYTES NFR BLD AUTO: 38.2 %
MCH RBC QN AUTO: 30.6 PG (ref 26–34)
MCHC RBC AUTO-ENTMCNC: 33.2 G/DL (ref 31–37)
MCV RBC AUTO: 92.1 FL
MICROALBUMIN UR-MCNC: <0.3 MG/DL
MONOCYTES # BLD AUTO: 0.41 X10(3) UL (ref 0.1–1)
MONOCYTES NFR BLD AUTO: 4.2 %
NEUTROPHILS # BLD AUTO: 5.4 X10 (3) UL (ref 1.5–7.7)
NEUTROPHILS # BLD AUTO: 5.4 X10(3) UL (ref 1.5–7.7)
NEUTROPHILS NFR BLD AUTO: 55.9 %
NONHDLC SERPL-MCNC: 156 MG/DL (ref ?–130)
OSMOLALITY SERPL CALC.SUM OF ELEC: 291 MOSM/KG (ref 275–295)
PLATELET # BLD AUTO: 251 10(3)UL (ref 150–450)
POTASSIUM SERPL-SCNC: 4 MMOL/L (ref 3.5–5.1)
PROT SERPL-MCNC: 7.2 G/DL (ref 5.7–8.2)
RBC # BLD AUTO: 4.68 X10(6)UL
SODIUM SERPL-SCNC: 139 MMOL/L (ref 136–145)
TRIGL SERPL-MCNC: 331 MG/DL (ref 30–149)
TSI SER-ACNC: 0.65 UIU/ML (ref 0.55–4.78)
VIT B12 SERPL-MCNC: 537 PG/ML (ref 211–911)
VIT D+METAB SERPL-MCNC: 19.8 NG/ML (ref 30–100)
VLDLC SERPL CALC-MCNC: 56 MG/DL (ref 0–30)
WBC # BLD AUTO: 9.7 X10(3) UL (ref 4–11)

## 2025-03-01 PROCEDURE — 36415 COLL VENOUS BLD VENIPUNCTURE: CPT

## 2025-03-01 PROCEDURE — 82306 VITAMIN D 25 HYDROXY: CPT

## 2025-03-01 PROCEDURE — 82043 UR ALBUMIN QUANTITATIVE: CPT

## 2025-03-01 PROCEDURE — 82570 ASSAY OF URINE CREATININE: CPT

## 2025-03-01 PROCEDURE — 80061 LIPID PANEL: CPT

## 2025-03-01 PROCEDURE — 85025 COMPLETE CBC W/AUTO DIFF WBC: CPT

## 2025-03-01 PROCEDURE — 80053 COMPREHEN METABOLIC PANEL: CPT

## 2025-03-01 PROCEDURE — 82607 VITAMIN B-12: CPT

## 2025-03-01 PROCEDURE — 84443 ASSAY THYROID STIM HORMONE: CPT

## 2025-03-01 RX ORDER — ATORVASTATIN CALCIUM 20 MG/1
20 TABLET, FILM COATED ORAL DAILY
Qty: 90 TABLET | Refills: 3 | Status: SHIPPED | OUTPATIENT
Start: 2025-03-01

## 2025-03-01 RX ORDER — ERGOCALCIFEROL 1.25 MG/1
50000 CAPSULE, LIQUID FILLED ORAL
Qty: 6 CAPSULE | Refills: 4 | Status: SHIPPED | OUTPATIENT
Start: 2025-03-01

## 2025-03-01 RX ORDER — GLIMEPIRIDE 1 MG/1
1 TABLET ORAL 2 TIMES DAILY
Qty: 180 TABLET | Refills: 4 | Status: SHIPPED | OUTPATIENT
Start: 2025-03-01

## 2025-03-01 RX ORDER — LEVOTHYROXINE SODIUM 50 UG/1
50 TABLET ORAL DAILY
Qty: 90 TABLET | Refills: 3 | Status: SHIPPED | OUTPATIENT
Start: 2025-03-01

## 2025-03-01 RX ORDER — LISINOPRIL 40 MG/1
40 TABLET ORAL DAILY
Qty: 90 TABLET | Refills: 3 | Status: SHIPPED | OUTPATIENT
Start: 2025-03-01

## 2025-03-01 RX ORDER — EMPAGLIFLOZIN 10 MG/1
10 TABLET, FILM COATED ORAL DAILY
Qty: 90 TABLET | Refills: 3 | Status: SHIPPED | OUTPATIENT
Start: 2025-03-01

## 2025-03-01 NOTE — PROGRESS NOTES
Alena Dumont is a 59 year old female.   Chief Complaint   Patient presents with    Diabetes     6 months     HPI:   Last A1c 7.1 and today 7.9.   Reports been eating more carbs. 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 Knows can do better. Does not want the GLP injection.    Medications Ordered Prior to Encounter[1]   Past Medical History:    Breast CA (HCC)    Cancer (HCC)    Diabetes (HCC)    Essential hypertension    Hyperlipidemia    Thyroid disease      Social History:  Social History     Socioeconomic History    Marital status:    Tobacco Use    Smoking status: Never    Smokeless tobacco: Never   Vaping Use    Vaping status: Never Used   Substance and Sexual Activity    Alcohol use: Yes     Comment: rarely    Drug use: Never        REVIEW OF SYSTEMS:   Review of Systems   See HPI     EXAM:   /70   Pulse 56   Ht 5' 5\" (1.651 m)   Wt 166 lb 6.4 oz (75.5 kg)   BMI 27.69 kg/m²   GENERAL: well developed, well nourished,in no apparent distress  LUNGS: clear to auscultation  CARDIO: RRR without murmur  EXTREMITIES: no cyanosis, clubbing or edema  Bilateral barefoot skin diabetic exam is normal, visualized feet and the appearance is normal.  Bilateral monofilament/sensation of both feet is normal.  Pulsation pedal pulse exam of both lower legs/feet is normal as well.       ASSESSMENT AND PLAN:   1. Type 2 diabetes mellitus without complication, without long-term current use of insulin (HCC)    - POC Glycohemoglobin [99470]  - CBC With Differential With Platelet; Future  - Comp Metabolic Panel (14); Future  - Lipid Panel; Future  - TSH W Reflex To Free T4; Future  - Vitamin B12; Future  - Vitamin D; Future  - Microalb/Creat Ratio, Random Urine; Future    2. Essential hypertension  BP stable     3. Hyperlipidemia, unspecified hyperlipidemia type      4. Vitamin D deficiency    - Vitamin D; Future    - CBC With Differential With Platelet; Future  - Comp Metabolic Panel (14); Future  - Lipid Panel; Future  - TSH W Reflex  To Free T4; Future  - Vitamin B12; Future  - Vitamin D; Future  - Microalb/Creat Ratio, Random Urine; Future        The patient indicates understanding of these issues and agrees to the plan.      Jordyn Brand MD  3/1/2025  9:11 AM         [1]   Current Outpatient Medications on File Prior to Visit   Medication Sig Dispense Refill    celecoxib 200 MG Oral Cap Take 1 capsule (200 mg total) by mouth daily. 30 capsule 1    JARDIANCE 10 MG Oral Tab Take 1 tablet (10 mg total) by mouth daily. 90 tablet 3    diclofenac (VOLTAREN) 1 % External Gel Apply 2 g topically 4 (four) times daily. 50 g 0    atorvastatin 20 MG Oral Tab Take 1 tablet (20 mg total) by mouth daily. 90 tablet 3    ergocalciferol 1.25 MG (27447 UT) Oral Cap Take 1 capsule (50,000 Units total) by mouth every 14 (fourteen) days. 12 capsule 4    glimepiride 1 MG Oral Tab Take 1 tablet (1 mg total) by mouth 2 (two) times daily. 180 tablet 4    levothyroxine 50 MCG Oral Tab Take 1 tablet (50 mcg total) by mouth daily. 90 tablet 3    lisinopril 40 MG Oral Tab Take 1 tablet (40 mg total) by mouth daily. 90 tablet 3    metFORMIN HCl 1000 MG Oral Tab Take 1 tablet (1,000 mg total) by mouth 2 (two) times daily. 180 tablet 3    diclofenac 1 % External Gel Apply 2 g topically 4 (four) times daily. 150 g 4    Diclofenac Sodium 1 % Transdermal Gel Apply 2 g topically 4 (four) times daily. 1 Tube 0     No current facility-administered medications on file prior to visit.

## 2025-03-08 DIAGNOSIS — E11.9 TYPE 2 DIABETES MELLITUS WITHOUT COMPLICATION, WITHOUT LONG-TERM CURRENT USE OF INSULIN (HCC): Primary | ICD-10-CM

## 2025-03-08 NOTE — PROGRESS NOTES
Make sure to restart weekly high dose vitamin D as your vitamin D levels are low and also take your nightly atorvastatin. Your cholesterol was higher. Will repeat everything in 6 months. I am placing the orders - have done right before your appointment. - Dr. Brand

## 2025-07-01 ENCOUNTER — HOSPITAL ENCOUNTER (OUTPATIENT)
Dept: CT IMAGING | Age: 60
Discharge: HOME | End: 2025-07-01
Attending: FAMILY MEDICINE

## 2025-07-01 ENCOUNTER — WALK IN (OUTPATIENT)
Dept: URGENT CARE | Age: 60
End: 2025-07-01
Attending: EMERGENCY MEDICINE

## 2025-07-01 ENCOUNTER — TELEPHONE (OUTPATIENT)
Dept: FAMILY MEDICINE CLINIC | Facility: CLINIC | Age: 60
End: 2025-07-01

## 2025-07-01 VITALS
TEMPERATURE: 98.2 F | SYSTOLIC BLOOD PRESSURE: 139 MMHG | OXYGEN SATURATION: 98 % | HEART RATE: 77 BPM | RESPIRATION RATE: 18 BRPM | DIASTOLIC BLOOD PRESSURE: 73 MMHG

## 2025-07-01 DIAGNOSIS — R35.0 FREQUENT URINATION: ICD-10-CM

## 2025-07-01 DIAGNOSIS — N10 PYELONEPHRITIS, ACUTE: Primary | ICD-10-CM

## 2025-07-01 LAB
APPEARANCE, POC: ABNORMAL
BILIRUB UR QL STRIP: NEGATIVE
COLOR UR: YELLOW
GLUCOSE UR-MCNC: ABNORMAL MG/DL
HGB UR QL STRIP: ABNORMAL
KETONES UR STRIP-MCNC: NEGATIVE MG/DL
LEUKOCYTE ESTERASE UR QL STRIP: ABNORMAL
NITRITE UR QL STRIP: NEGATIVE
PH UR: 5.5 [PH] (ref 5–7)
PROT UR-MCNC: ABNORMAL MG/DL
SP GR UR: 1.02 (ref 1–1.03)
UROBILINOGEN UR-MCNC: 0.2 MG/DL (ref 0–1)

## 2025-07-01 PROCEDURE — 87186 SC STD MICRODIL/AGAR DIL: CPT | Performed by: FAMILY MEDICINE

## 2025-07-01 PROCEDURE — 74176 CT ABD & PELVIS W/O CONTRAST: CPT

## 2025-07-01 PROCEDURE — 81003 URINALYSIS AUTO W/O SCOPE: CPT | Performed by: FAMILY MEDICINE

## 2025-07-01 RX ORDER — TRAMADOL HYDROCHLORIDE 50 MG/1
50 TABLET ORAL EVERY 6 HOURS PRN
Qty: 20 TABLET | Refills: 0 | Status: SHIPPED | OUTPATIENT
Start: 2025-07-01

## 2025-07-01 RX ORDER — CIPROFLOXACIN 500 MG/1
500 TABLET, FILM COATED ORAL 2 TIMES DAILY
Qty: 14 TABLET | Refills: 0 | Status: SHIPPED | OUTPATIENT
Start: 2025-07-01 | End: 2025-07-08

## 2025-07-01 ASSESSMENT — PAIN SCALES - GENERAL: PAINLEVEL_OUTOF10: 7

## 2025-07-01 NOTE — PROGRESS NOTES
Subjective:   Alena Dumont is a 59 year old female who presents for Urgent Care F/u (6/29/25 ([facility]) pain w/ urination & back and pelvic pain:   still has pain )   Patient is a 59-year-old female with past medical history significant for DM2, hypertension, hyperlipidemia, thyroid disease and history of breast cancer.  Patient presents to office today new to this provider for urgent care follow-up. Review of telephone encounter reveals    \"Pt stated she need a follow up appt as she was seen in [facility] last Sunday for pain with urination, back and pelvic pain.   She was recommended to see PCP and specialist.   She denies chest pain, shortness of breath, headache, numbness/ tingling, no other sx.   She was advised if sx persist or gets worse to go to ER/ IC, she stated understanding.   She will  meds now from [facility] given by .   Follow up appt made with Manpreet PRASAD\"    Patient in urgent care [facility] on 7/1/2025 for reported frequent urination.  Urinalysis was completed that showed trace leukocytes, hematuria, and glucose 500 on Jardiance.  CT of the abdomen and pelvis was performed that showed \"Minimal periureteric stranding and minimal dilatation of the right  ureter without calcific stone disease seen or etiology for the changes.  Consider infection or inflammation or recent passage of stone. \"  Patient was discharged home on ciprofloxacin twice daily x 7 days and tramadol for pain.     Patient states she is still having pain and issues. Reviewed urine culture and bacteria resistant to ciprofloxacin.  Will start Bactrim DS twice daily x 7 days which shown sensitivities.  Will repeat urinalysis 4 weeks complete after completion due to ESBL.  Patient was encouraged to increase water intake and avoid bladder irritants pain.  Patient was advised on red flags.  Patient will follow-up as needed.          Past Medical History[1]   Past Surgical History[2]     History/Other:     Chief Complaint Reviewed and Verified  Nursing Notes Reviewed and   Verified  Tobacco Reviewed  Allergies Reviewed  Medications Reviewed    Problem List Reviewed  Medical History Reviewed  Surgical History   Reviewed  OB Status Reviewed  Family History Reviewed  Social History   Reviewed         Tobacco:  She has never smoked tobacco.    Current Medications[3]      Review of Systems:  Review of Systems   Constitutional:  Negative for chills and fever.   Respiratory:  Negative for shortness of breath.    Cardiovascular:  Negative for chest pain.   Genitourinary:  Positive for dysuria and pelvic pain.   Musculoskeletal:  Positive for back pain.         Objective:   /70 (BP Location: Right arm, Patient Position: Sitting, Cuff Size: adult)   Pulse 76   Ht 5' 5\" (1.651 m)   Wt 160 lb 12.8 oz (72.9 kg)   SpO2 96%   BMI 26.76 kg/m²  Estimated body mass index is 26.76 kg/m² as calculated from the following:    Height as of this encounter: 5' 5\" (1.651 m).    Weight as of this encounter: 160 lb 12.8 oz (72.9 kg).  Physical Exam  Vitals and nursing note reviewed.   Constitutional:       Appearance: Normal appearance.   Cardiovascular:      Rate and Rhythm: Normal rate and regular rhythm.      Pulses: Normal pulses.      Heart sounds: Normal heart sounds. No murmur heard.  Pulmonary:      Effort: Pulmonary effort is normal.      Breath sounds: Normal breath sounds.   Abdominal:      Tenderness: There is no right CVA tenderness or left CVA tenderness.   Skin:     Capillary Refill: Capillary refill takes less than 2 seconds.   Neurological:      Mental Status: She is alert and oriented to person, place, and time.           Assessment & Plan:   1. E-coli UTI (Primary)  -     Sulfamethoxazole-Trimethoprim; Take 1 tablet by mouth 2 (two) times daily for 7 days.  Dispense: 14 tablet; Refill: 0  -     Urinalysis with Culture Reflex; Future; Expected date: 08/11/2025  Urinalysis positive esbl ecoli uti  Culture  shows resistance to ciprofloxacin.  Stop ciprofloxacin.  Start Bactrim DS twice daily x 7 days  Increase water intake  Avoid bladder irritants including alcohol and caffeine.  Recheck urinalysis with reflex culture 4 weeks after completion of antibiotic due to drug-resistant bacteria  Follow up as needed.     Patient aware of plan of care. All questions answered to satisfaction of the patient. Patient instructed to call office or reach out via [employer] if any issues arise. For urgent issues and/or reviewed red flags please proceed to the urgent care or ER.  Also, inform the nurse practitioner with any new symptoms or medication side effects.        Return for Annual physical.    Manpreet Lua, APRN, 7/1/2025, 4:14 PM          [1]   Past Medical History:   Breast CA (HCC)    Cancer (HCC)    Diabetes (HCC)    Essential hypertension    Hyperlipidemia    Thyroid disease   [2]   Past Surgical History:  Procedure Laterality Date    Cholecystectomy      Cyst aspiration right  10/22/2005    us FNA    Mastectomy right  2002    Needle biopsy left  05/21/2008    us guided    Reduction left  2002    lift   [3]   Current Outpatient Medications   Medication Sig Dispense Refill    ciprofloxacin 500 MG Oral Tab Take 1 tablet (500 mg total) by mouth 2 (two) times daily.      sulfamethoxazole-trimethoprim -160 MG Oral Tab per tablet Take 1 tablet by mouth 2 (two) times daily for 7 days. 14 tablet 0    ergocalciferol 1.25 MG (91126 UT) Oral Cap Take 1 capsule (50,000 Units total) by mouth every 14 (fourteen) days. 6 capsule 4    glimepiride 1 MG Oral Tab Take 1 tablet (1 mg total) by mouth 2 (two) times daily. 180 tablet 4    metFORMIN HCl 1000 MG Oral Tab Take 1 tablet (1,000 mg total) by mouth 2 (two) times daily. 180 tablet 3    JARDIANCE 10 MG Oral Tab Take 1 tablet (10 mg total) by mouth daily. 90 tablet 3    atorvastatin 20 MG Oral Tab Take 1 tablet (20 mg total) by mouth daily. 90 tablet 3    lisinopril 40 MG Oral Tab Take  1 tablet (40 mg total) by mouth daily. 90 tablet 3    levothyroxine 50 MCG Oral Tab Take 1 tablet (50 mcg total) by mouth daily. 90 tablet 3    diclofenac 1 % External Gel Apply 2 g topically 4 (four) times daily. 150 g 4    celecoxib 200 MG Oral Cap Take 1 capsule (200 mg total) by mouth daily. 30 capsule 1    traMADol 50 MG Oral Tab Take 1 tablet (50 mg total) by mouth every 6 (six) hours as needed for Pain. (Patient not taking: Reported on 7/3/2025)

## 2025-07-01 NOTE — TELEPHONE ENCOUNTER
Spoke with patient via [employer]   Carmela ID [id number] Date of Birth verified.  Pt stated she need a follow up appt as she was seen in [facility] last Sunday for pain with urination, back and pelvic pain.   She was recommended to see PCP and specialist.   She denies chest pain, shortness of breath, headache, numbness/ tingling, no other sx.   She was advised if sx persist or gets worse to go to ER/ IC, she stated understanding.   [facility]   Follow up appt made with Manpreet PRASAD.        Future Appointments   Date Time Provider Department Center   7/3/2025 10:00 AM Manpreet Lua APRN [facility]   9/20/2025  9:45 AM Jordyn Brand MD [facility] ADO

## 2025-07-03 ENCOUNTER — OFFICE VISIT (OUTPATIENT)
Dept: FAMILY MEDICINE CLINIC | Facility: CLINIC | Age: 60
End: 2025-07-03

## 2025-07-03 ENCOUNTER — RESULTS FOLLOW-UP (OUTPATIENT)
Dept: URGENT CARE | Age: 60
End: 2025-07-03

## 2025-07-03 VITALS
OXYGEN SATURATION: 96 % | TEMPERATURE: 98 F | SYSTOLIC BLOOD PRESSURE: 113 MMHG | WEIGHT: 160.81 LBS | HEART RATE: 76 BPM | DIASTOLIC BLOOD PRESSURE: 70 MMHG | HEIGHT: 65 IN | BODY MASS INDEX: 26.79 KG/M2

## 2025-07-03 DIAGNOSIS — N39.0 E-COLI UTI: Primary | ICD-10-CM

## 2025-07-03 DIAGNOSIS — B96.20 E-COLI UTI: Primary | ICD-10-CM

## 2025-07-03 LAB — BACTERIA UR CULT: ABNORMAL

## 2025-07-03 PROCEDURE — 99213 OFFICE O/P EST LOW 20 MIN: CPT

## 2025-07-03 RX ORDER — SULFAMETHOXAZOLE AND TRIMETHOPRIM 800; 160 MG/1; MG/1
1 TABLET ORAL 2 TIMES DAILY
Qty: 14 TABLET | Refills: 0 | Status: SHIPPED | OUTPATIENT
Start: 2025-07-03 | End: 2025-07-10

## 2025-07-03 RX ORDER — TRAMADOL HYDROCHLORIDE 50 MG/1
50 TABLET ORAL EVERY 6 HOURS PRN
COMMUNITY
Start: 2025-07-01

## 2025-07-03 RX ORDER — CIPROFLOXACIN 500 MG/1
500 TABLET, FILM COATED ORAL 2 TIMES DAILY
COMMUNITY
Start: 2025-07-01 | End: 2025-07-08

## 2025-07-19 ENCOUNTER — OFFICE VISIT (OUTPATIENT)
Dept: FAMILY MEDICINE CLINIC | Facility: CLINIC | Age: 60
End: 2025-07-19

## 2025-07-19 VITALS
BODY MASS INDEX: 27.49 KG/M2 | HEIGHT: 65 IN | WEIGHT: 165 LBS | DIASTOLIC BLOOD PRESSURE: 77 MMHG | SYSTOLIC BLOOD PRESSURE: 127 MMHG | HEART RATE: 71 BPM

## 2025-07-19 DIAGNOSIS — R60.1 GENERALIZED EDEMA: ICD-10-CM

## 2025-07-19 DIAGNOSIS — L23.7 POISON IVY: Primary | ICD-10-CM

## 2025-07-19 PROCEDURE — 99214 OFFICE O/P EST MOD 30 MIN: CPT | Performed by: FAMILY MEDICINE

## 2025-07-19 RX ORDER — BETAMETHASONE DIPROPIONATE 0.5 MG/G
CREAM TOPICAL
Qty: 45 G | Refills: 0 | Status: SHIPPED | OUTPATIENT
Start: 2025-07-19

## 2025-07-19 RX ORDER — FUROSEMIDE 20 MG/1
20 TABLET ORAL DAILY
Qty: 5 TABLET | Refills: 0 | Status: SHIPPED | OUTPATIENT
Start: 2025-07-19

## 2025-07-19 RX ORDER — HYDROXYZINE HYDROCHLORIDE 25 MG/1
25 TABLET, FILM COATED ORAL 3 TIMES DAILY PRN
Qty: 30 TABLET | Refills: 0 | Status: SHIPPED | OUTPATIENT
Start: 2025-07-19

## 2025-07-19 NOTE — PROGRESS NOTES
The following individual(s) verbally consented to be recorded using ambient AI listening technology and understand that they can each withdraw their consent to this listening technology at any point by asking the clinician to turn off or pause the recording:    Patient name: Alena Dumont is a 60 year old female.   Chief Complaint   Patient presents with    Urinary Symptoms     HPI:        Alena Dumont is a 59 year old female with diabetes who presents with swelling of the feet and a rash.    She experiences swelling of her feet, particularly noticeable after returning from work, and swelling of her face, especially around the eyes and eyelids, in the mornings. She is concerned about a potential connection to her kidneys due to her diabetes history. No current swelling at the time of the visit.    She has a history of diabetes and is currently taking her diabetes medication. Her blood sugar levels have been around 148 to 156 mg/dL, which is an improvement from previous levels.    She developed a rash that she associates with poison ivy exposure. She recalls a similar rash occurring four to five years ago, which was treated with antibiotics. The current rash appeared after contact with plants while gardening. The rash consists of small blister-like lesions filled with fluid.    She visited an urgent care facility where she was given antibiotics, but the diagnosis was uncertain.       Medications Ordered Prior to Encounter[1]   Past Medical History[2]   Social History:  Short Social Hx on File[3]     REVIEW OF SYSTEMS:   Review of Systems       EXAM:   /77   Pulse 71   Ht 5' 5\" (1.651 m)   Wt 165 lb (74.8 kg)   BMI 27.46 kg/m²   Wt Readings from Last 3 Encounters:   07/23/25 161 lb 9.6 oz (73.3 kg)   07/19/25 165 lb (74.8 kg)   07/03/25 160 lb 12.8 oz (72.9 kg)     GENERAL: well developed, well nourished,in no apparent distress  SKIN: linear vesicular rash on legs and arms, upper  chest and leigh on face    ASSESSMENT AND PLAN:   1. Poison ivy      2. Generalized edema         Poison Ivy Dermatitis  Rash consistent with poison ivy dermatitis due to gardening exposure. Symptoms attributed to allergic reaction. Educated on poison ivy appearance and avoidance.  - Prescribe topical cream for rash.  - Advise cold compresses for discomfort.  - Instruct to wear protective clothing and gloves while gardening.  - Educate on poison ivy appearance to avoid contact.    Peripheral Edema  Swelling in feet and face due to allergic reaction from poison ivy, not renal issue.  - Prescribe diuretic for five days to reduce fluid retention.    Diabetes Mellitus  Diabetes managed with medication. Blood glucose levels improved. Current symptoms attributed to poison ivy exposure. Blood tests ordered for kidney function and diabetes control monitoring.  - Continue current diabetes medication regimen.  - Order blood tests to monitor kidney function and diabetes control.         The patient indicates understanding of these issues and agrees to the plan.      Jordyn Brand MD  7/28/2025  6:19 PM         [1]   Current Outpatient Medications on File Prior to Visit   Medication Sig Dispense Refill    ergocalciferol 1.25 MG (66017 UT) Oral Cap Take 1 capsule (50,000 Units total) by mouth every 14 (fourteen) days. 6 capsule 4    glimepiride 1 MG Oral Tab Take 1 tablet (1 mg total) by mouth 2 (two) times daily. 180 tablet 4    metFORMIN HCl 1000 MG Oral Tab Take 1 tablet (1,000 mg total) by mouth 2 (two) times daily. 180 tablet 3    JARDIANCE 10 MG Oral Tab Take 1 tablet (10 mg total) by mouth daily. 90 tablet 3    atorvastatin 20 MG Oral Tab Take 1 tablet (20 mg total) by mouth daily. 90 tablet 3    lisinopril 40 MG Oral Tab Take 1 tablet (40 mg total) by mouth daily. 90 tablet 3    levothyroxine 50 MCG Oral Tab Take 1 tablet (50 mcg total) by mouth daily. 90 tablet 3     No current facility-administered medications on  file prior to visit.   [2]   Past Medical History:   Breast CA (HCC)    Cancer (HCC)    Diabetes (HCC)    Essential hypertension    Hyperlipidemia    Thyroid disease   [3]   Social History  Socioeconomic History    Marital status:    Tobacco Use    Smoking status: Never     Passive exposure: Never    Smokeless tobacco: Never   Vaping Use    Vaping status: Never Used   Substance and Sexual Activity    Alcohol use: Yes     Comment: rarely    Drug use: Never

## 2025-07-22 ENCOUNTER — TELEPHONE (OUTPATIENT)
Dept: FAMILY MEDICINE CLINIC | Facility: CLINIC | Age: 60
End: 2025-07-22

## 2025-07-22 NOTE — TELEPHONE ENCOUNTER
Patient reports that rash is getting worse. States that she couldn't stand the pain today and ended up going to hospital. Calling to make follow up appointment. Appointment scheduled with first available provider.

## 2025-07-23 ENCOUNTER — OFFICE VISIT (OUTPATIENT)
Dept: FAMILY MEDICINE CLINIC | Facility: CLINIC | Age: 60
End: 2025-07-23

## 2025-07-23 VITALS
HEIGHT: 65 IN | WEIGHT: 161.63 LBS | DIASTOLIC BLOOD PRESSURE: 71 MMHG | HEART RATE: 80 BPM | SYSTOLIC BLOOD PRESSURE: 131 MMHG | BODY MASS INDEX: 26.93 KG/M2

## 2025-07-23 DIAGNOSIS — B99.9 SUPERIMPOSED INFECTION: ICD-10-CM

## 2025-07-23 DIAGNOSIS — L23.7 POISON IVY: Primary | ICD-10-CM

## 2025-07-23 PROCEDURE — 99213 OFFICE O/P EST LOW 20 MIN: CPT | Performed by: FAMILY MEDICINE

## 2025-07-23 RX ORDER — CETIRIZINE HYDROCHLORIDE 10 MG/1
10 TABLET ORAL DAILY
Qty: 30 TABLET | Refills: 1 | Status: SHIPPED | OUTPATIENT
Start: 2025-07-23

## 2025-07-23 RX ORDER — DOXYCYCLINE 100 MG/1
100 TABLET ORAL 2 TIMES DAILY
Qty: 20 TABLET | Refills: 0 | Status: SHIPPED | OUTPATIENT
Start: 2025-07-23 | End: 2025-08-02

## 2025-07-23 RX ORDER — METHYLPREDNISOLONE 4 MG/1
TABLET ORAL
COMMUNITY
Start: 2025-07-22

## 2025-07-23 NOTE — PROGRESS NOTES
7/23/2025  1:20 PM    Alena Dumont is a 59 year old female.    Chief complaint(s):   Chief Complaint   Patient presents with    ER F/U     Poison ivy      HPI:     Alena Dumont primary complaint is regarding poison ivy.       Alena Dumont is a 59 year old female with hx DM, who presents with a rash. Symptoms have been present for 7 days. This rash has not appeared before. Previous dermatologic condition include none. The rash is located on the neck, UE and LEs. Since then it has spread. Parent has tried Betametasone cream, Medropack, Augmentin, Benadryl, Hydroxyzine for initial treatment and the rashh has worsened. Itchiness and discomfort has been is severe. Patient does not have a fever. Recent illnesses: none. Sick contacts: none known. Possible contribution elements include exposed to plants.       HISTORY:  Past Medical History[1]   Past Surgical History[2]   Family History[3]   Social History: Short Social Hx on File[4]     Immunizations:   Immunization History   Administered Date(s) Administered    Covid-19 Vaccine Moderna 100 mcg/0.5 ml 03/15/2021, 04/12/2021    Covid-19 Vaccine Pfizer 30 mcg/0.3 ml 11/07/2021    FLUZONE 6 months and older PFS 0.5 ml (99691) 09/22/2019, 10/02/2021    Fluarix 6 Months And Older 0.5 ml prefilled syringe (52231) 08/22/2020    Pneumococcal (Prevnar 13) 09/22/2019    Pneumococcal Conjugate PCV20 02/17/2024    TDAP 06/19/2022    Zoster Vaccine Recombinant Adjuvanted (Shingrix) 09/24/2019, 06/28/2021       Medications (Active prior to today's visit):  Current Medications[5]    Allergies:  Allergies[6]      ROS:   Review of Systems   Constitutional:  Negative for appetite change and fever.   Eyes:  Negative for visual disturbance.   Respiratory:  Negative for shortness of breath.    Cardiovascular:  Negative for chest pain.   Gastrointestinal:  Negative for abdominal pain, nausea and vomiting.   Musculoskeletal:  Negative for back pain.   Skin:  Positive for rash.    Neurological:  Negative for dizziness and headaches.       PHYSICAL EXAM:   VS: /71   Pulse 80   Ht 5' 5\" (1.651 m)   Wt 161 lb 9.6 oz (73.3 kg)   BMI 26.89 kg/m²     Physical Exam  Vitals reviewed.   Constitutional:       General: She is not in acute distress.     Appearance: Normal appearance.   HENT:      Head: Normocephalic.   Eyes:      Conjunctiva/sclera: Conjunctivae normal.   Cardiovascular:      Rate and Rhythm: Normal rate.   Pulmonary:      Effort: Pulmonary effort is normal.   Musculoskeletal:      Cervical back: Neck supple.   Skin:     Findings: Rash (diffuse) present. Rash is crusting, macular, pustular and vesicular.   Psychiatric:         Mood and Affect: Mood normal.         LABORATORY RESULTS:     EKG / Spirometry : -     Radiology: No results found.     ASSESSMENT/PLAN:   Assessment   Encounter Diagnoses   Name Primary?    Poison ivy Yes    Superimposed infection          MEDICATIONS:  Current Medications[7]         Refills Given today:    Requested Prescriptions     Signed Prescriptions Disp Refills    Doxycycline Monohydrate 100 MG Oral Tab 20 tablet 0     Sig: Take 100 mg by mouth 2 (two) times daily for 10 days.    cetirizine 10 MG Oral Tab 30 tablet 1     Sig: Take 1 tablet (10 mg total) by mouth daily.        RECOMMENDATIONS given include: Patient was reassured of  her medical condition and all questions and concerns were answered. Patient was informed to please, call our office with any new or further questions or concerns that may come up in the near future. Notify Dr French or the [facility] if there is a deterioration or worsening of the medical condition. Also, inform the doctor with any new symptoms or medications' side effects.  Wash all clothes, bed sheets, towels, etc.     FOLLOW-UP: Schedule a follow-up visit in 2 weeks with PCP.                 Orders This Visit:  No orders of the defined types were placed in this encounter.      Meds This Visit:  Requested  Prescriptions     Signed Prescriptions Disp Refills    Doxycycline Monohydrate 100 MG Oral Tab 20 tablet 0     Sig: Take 100 mg by mouth 2 (two) times daily for 10 days.    cetirizine 10 MG Oral Tab 30 tablet 1     Sig: Take 1 tablet (10 mg total) by mouth daily.       Imaging & Referrals:  None         VIRIDIANA FROST MD       [1]   Past Medical History:   Breast CA (HCC)    Cancer (HCC)    Diabetes (HCC)    Essential hypertension    Hyperlipidemia    Thyroid disease   [2]   Past Surgical History:  Procedure Laterality Date    Cholecystectomy      Cyst aspiration right  10/22/2005    us FNA    Mastectomy right  2002    Needle biopsy left  05/21/2008    us guided    Reduction left  2002    lift   [3]   Family History  Problem Relation Age of Onset    Pancreatic Cancer Mother     Hypertension Mother     Lipids Mother     Cancer Mother 81        pancreatic ca    Diabetes Sister     Diabetes Brother     Breast Cancer Self 37   [4]   Social History  Socioeconomic History    Marital status:    Tobacco Use    Smoking status: Never     Passive exposure: Never    Smokeless tobacco: Never   Vaping Use    Vaping status: Never Used   Substance and Sexual Activity    Alcohol use: Yes     Comment: rarely    Drug use: Never   [5]   Current Outpatient Medications   Medication Sig Dispense Refill    methylPREDNISolone 4 MG Oral Tablet Therapy Pack       Doxycycline Monohydrate 100 MG Oral Tab Take 100 mg by mouth 2 (two) times daily for 10 days. 20 tablet 0    cetirizine 10 MG Oral Tab Take 1 tablet (10 mg total) by mouth daily. 30 tablet 1    furosemide 20 MG Oral Tab Take 1 tablet (20 mg total) by mouth daily. 5 tablet 0    betamethasone dipropionate 0.05 % External Cream Apply to affected area twice a day 45 g 0    ergocalciferol 1.25 MG (87682 UT) Oral Cap Take 1 capsule (50,000 Units total) by mouth every 14 (fourteen) days. 6 capsule 4    glimepiride 1 MG Oral Tab Take 1 tablet (1 mg total) by mouth 2 (two) times  daily. 180 tablet 4    metFORMIN HCl 1000 MG Oral Tab Take 1 tablet (1,000 mg total) by mouth 2 (two) times daily. 180 tablet 3    JARDIANCE 10 MG Oral Tab Take 1 tablet (10 mg total) by mouth daily. 90 tablet 3    atorvastatin 20 MG Oral Tab Take 1 tablet (20 mg total) by mouth daily. 90 tablet 3    lisinopril 40 MG Oral Tab Take 1 tablet (40 mg total) by mouth daily. 90 tablet 3    levothyroxine 50 MCG Oral Tab Take 1 tablet (50 mcg total) by mouth daily. 90 tablet 3    hydrOXYzine 25 MG Oral Tab Take 1 tablet (25 mg total) by mouth 3 (three) times daily as needed for Itching. (Patient not taking: Reported on 7/23/2025) 30 tablet 0   [6] No Known Allergies  [7]   methylPREDNISolone 4 MG Oral Tablet Therapy Pack, , Disp: , Rfl:     Doxycycline Monohydrate 100 MG Oral Tab, Take 100 mg by mouth 2 (two) times daily for 10 days., Disp: 20 tablet, Rfl: 0    cetirizine 10 MG Oral Tab, Take 1 tablet (10 mg total) by mouth daily., Disp: 30 tablet, Rfl: 1    furosemide 20 MG Oral Tab, Take 1 tablet (20 mg total) by mouth daily., Disp: 5 tablet, Rfl: 0    betamethasone dipropionate 0.05 % External Cream, Apply to affected area twice a day, Disp: 45 g, Rfl: 0    ergocalciferol 1.25 MG (11730 UT) Oral Cap, Take 1 capsule (50,000 Units total) by mouth every 14 (fourteen) days., Disp: 6 capsule, Rfl: 4    glimepiride 1 MG Oral Tab, Take 1 tablet (1 mg total) by mouth 2 (two) times daily., Disp: 180 tablet, Rfl: 4    metFORMIN HCl 1000 MG Oral Tab, Take 1 tablet (1,000 mg total) by mouth 2 (two) times daily., Disp: 180 tablet, Rfl: 3    JARDIANCE 10 MG Oral Tab, Take 1 tablet (10 mg total) by mouth daily., Disp: 90 tablet, Rfl: 3    atorvastatin 20 MG Oral Tab, Take 1 tablet (20 mg total) by mouth daily., Disp: 90 tablet, Rfl: 3    lisinopril 40 MG Oral Tab, Take 1 tablet (40 mg total) by mouth daily., Disp: 90 tablet, Rfl: 3    levothyroxine 50 MCG Oral Tab, Take 1 tablet (50 mcg total) by mouth daily., Disp: 90 tablet, Rfl: 3     hydrOXYzine 25 MG Oral Tab, Take 1 tablet (25 mg total) by mouth 3 (three) times daily as needed for Itching. (Patient not taking: Reported on 7/23/2025), Disp: 30 tablet, Rfl: 0

## 2025-07-31 ENCOUNTER — APPOINTMENT (OUTPATIENT)
Dept: URBAN - METROPOLITAN AREA CLINIC 246 | Age: 60
Setting detail: DERMATOLOGY
End: 2025-07-31

## 2025-07-31 DIAGNOSIS — L50.8 OTHER URTICARIA: ICD-10-CM

## 2025-07-31 PROCEDURE — OTHER TREATMENT REGIMEN: OTHER

## 2025-07-31 PROCEDURE — OTHER COUNSELING: OTHER

## 2025-07-31 PROCEDURE — 99204 OFFICE O/P NEW MOD 45 MIN: CPT

## 2025-07-31 PROCEDURE — OTHER MIPS QUALITY: OTHER

## 2025-07-31 PROCEDURE — OTHER PRESCRIPTION: OTHER

## 2025-07-31 PROCEDURE — OTHER PHOTO-DOCUMENTATION: OTHER

## 2025-07-31 RX ORDER — TRIAMCINOLONE ACETONIDE 1 MG/G
CREAM TOPICAL
Qty: 80 | Refills: 0 | Status: ERX | COMMUNITY
Start: 2025-07-31

## 2025-07-31 RX ORDER — LEVOCETIRIZINE DIHYDROCHLORIDE 5 MG
TABLET ORAL
Qty: 30 | Refills: 1 | Status: ERX | COMMUNITY
Start: 2025-07-31

## 2025-07-31 RX ORDER — HYDROCORTISONE 25 MG/G
CREAM TOPICAL
Qty: 30 | Refills: 0 | Status: ERX | COMMUNITY
Start: 2025-07-31

## 2025-07-31 RX ORDER — LORATADINE 10 MG
TABLET ORAL
Qty: 30 | Refills: 1 | Status: ERX | COMMUNITY
Start: 2025-07-31

## 2025-07-31 RX ORDER — FAMOTIDINE 20 MG/1
TABLET, FILM COATED ORAL
Qty: 60 | Refills: 1 | Status: ERX | COMMUNITY
Start: 2025-07-31

## 2025-08-07 ENCOUNTER — TELEPHONE (OUTPATIENT)
Dept: FAMILY MEDICINE CLINIC | Facility: CLINIC | Age: 60
End: 2025-08-07

## 2025-08-19 ENCOUNTER — TELEPHONE (OUTPATIENT)
Dept: FAMILY MEDICINE CLINIC | Facility: CLINIC | Age: 60
End: 2025-08-19

## (undated) NOTE — LETTER
5/7/2021              Gideon Call        [address]         Dear Javid Plummer,    This letter is to inform you that our office has made several attempts to reach you by phone without success.   We were attempting to contact yo

## (undated) NOTE — LETTER
02/03/22        Bonny Castleman  [address] [address]      Dear Timur Nocona records indicate that you have outstanding lab work and or testing that was ordered for you and has not yet been completed:  Orders Placed This Encounter

## (undated) NOTE — LETTER
08/20/20        Tiney Habermann  [address]      Dear Mikal Moss,    Our records indicate that you have outstanding lab work and or testing that was ordered for you and has not yet been completed:  Orders Placed This Encounter      H

## (undated) NOTE — LETTER
10/28/21        Gigi Antonio  [address]      Dear Dominique Bailey,    Our records indicate that you have outstanding lab work and or testing that was ordered for you and has not yet been completed:  Orders Placed This Encounter      H

## (undated) NOTE — LETTER
6/28/2023              Rimma Duarte        [address] [id number]         Dear Nader Salgado,    This letter is to inform you that our office has made several attempts to reach you by phone without success. We were attempting to contact you by phone regarding lab results    Please contact our office at the number listed below as soon as you receive this letter to discuss this issue and to make the necessary changes in our system to your contact information. Thank you for your cooperation.         Sincerely,    Carmela Chao MD  [facility], [facility] [address] [facility], [address] [place] [address]  [phone]

## (undated) NOTE — LETTER
July 29, 2020     [address] [facility]      Dear Gaviota Hammond:    Below are the results from your recent visit: Normal mammogram. Plan for repeat in 1 year.     Resulted Orders   [facility] JIMMIE 2D+3D SCREENING LEFT (CPT=[id number] Your patient's answers to the health and family history questions collected during this mammogram indicate a potentially increased risk for breast cancer.  It is recommended that this patient be evaluated in our [address] [place] [address] to determine